# Patient Record
Sex: FEMALE | Race: WHITE | Employment: OTHER | URBAN - METROPOLITAN AREA
[De-identification: names, ages, dates, MRNs, and addresses within clinical notes are randomized per-mention and may not be internally consistent; named-entity substitution may affect disease eponyms.]

---

## 2018-09-17 ENCOUNTER — OFFICE VISIT (OUTPATIENT)
Dept: RHEUMATOLOGY | Facility: CLINIC | Age: 73
End: 2018-09-17
Payer: MEDICARE

## 2018-09-17 VITALS
HEIGHT: 63 IN | SYSTOLIC BLOOD PRESSURE: 120 MMHG | WEIGHT: 258 LBS | BODY MASS INDEX: 45.71 KG/M2 | DIASTOLIC BLOOD PRESSURE: 82 MMHG | RESPIRATION RATE: 16 BRPM | HEART RATE: 82 BPM

## 2018-09-17 DIAGNOSIS — E55.9 VITAMIN D DEFICIENCY: ICD-10-CM

## 2018-09-17 DIAGNOSIS — M17.0 PRIMARY OSTEOARTHRITIS OF BOTH KNEES: Primary | ICD-10-CM

## 2018-09-17 DIAGNOSIS — R01.1 HEART MURMUR: ICD-10-CM

## 2018-09-17 DIAGNOSIS — M79.642 BILATERAL HAND PAIN: ICD-10-CM

## 2018-09-17 DIAGNOSIS — Z87.39 HX OF ACUTE GOUTY ARTHRITIS: ICD-10-CM

## 2018-09-17 DIAGNOSIS — M79.641 BILATERAL HAND PAIN: ICD-10-CM

## 2018-09-17 DIAGNOSIS — Z87.448 HISTORY OF KIDNEY DISEASE: ICD-10-CM

## 2018-09-17 DIAGNOSIS — R53.82 CHRONIC FATIGUE: ICD-10-CM

## 2018-09-17 DIAGNOSIS — E66.01 MORBID OBESITY (HCC): ICD-10-CM

## 2018-09-17 PROCEDURE — 99204 OFFICE O/P NEW MOD 45 MIN: CPT | Performed by: INTERNAL MEDICINE

## 2018-09-17 RX ORDER — PREDNISONE 10 MG/1
TABLET ORAL AS DIRECTED
COMMUNITY
Start: 2018-01-08

## 2018-09-17 RX ORDER — FEXOFENADINE HCL 180 MG/1
180 TABLET ORAL DAILY
COMMUNITY
Start: 2017-08-04

## 2018-09-17 RX ORDER — TRAMADOL HYDROCHLORIDE 50 MG/1
50 TABLET ORAL EVERY 8 HOURS PRN
COMMUNITY
Start: 2018-01-08 | End: 2018-09-17

## 2018-09-17 RX ORDER — FLUTICASONE PROPIONATE 50 MCG
1 SPRAY, SUSPENSION (ML) NASAL DAILY
COMMUNITY
Start: 2018-08-23

## 2018-09-17 RX ORDER — DULOXETIN HYDROCHLORIDE 20 MG/1
20 CAPSULE, DELAYED RELEASE ORAL DAILY
Qty: 30 CAPSULE | Refills: 2 | Status: SHIPPED | OUTPATIENT
Start: 2018-09-17

## 2018-09-17 RX ORDER — RUFINAMIDE 40 MG/ML
1 SUSPENSION ORAL DAILY
COMMUNITY

## 2018-09-17 RX ORDER — TRAMADOL HYDROCHLORIDE 50 MG/1
50 TABLET ORAL EVERY 8 HOURS PRN
Qty: 90 TABLET | Refills: 0 | Status: SHIPPED | OUTPATIENT
Start: 2018-09-17 | End: 2018-09-17

## 2018-09-17 RX ORDER — LISINOPRIL 40 MG/1
40 TABLET ORAL DAILY
COMMUNITY
Start: 2018-08-20

## 2018-09-17 RX ORDER — CHOLECALCIFEROL (VITAMIN D3) 50 MCG
1 TABLET ORAL DAILY
COMMUNITY

## 2018-09-17 RX ORDER — OXYBUTYNIN CHLORIDE 10 MG/1
10 TABLET, EXTENDED RELEASE ORAL DAILY
COMMUNITY
Start: 2018-08-20

## 2018-09-17 NOTE — PROGRESS NOTES
?  RHEUMATOLOGY NEW PATIENT   Date of visit: 9/17/2018  ? Patient presents with:  Establish Care: New pt referred by son (Pt of Dr. Aroldo Long). Pt was seeing RH at [address] [place] [address] x 30 years. Pt c/o bilateral hands/knees/fingers/ankles pain (pain level 6/10).  Pt st hyaluronic acid derivative injections at next visit. Her hands are concerning for possible RA, so we will recheck her RF/CCP/CRP/ESR status as well as xrays of her hands.  She has not established care with a new PCP, but we will check her TSH as well as vit Future    Chronic fatigue  -     TSH W REFLEX TO FREE T4; Future  -     VITAMIN D, 25-HYDROXY; Future  -     CARD ECHO 2D DOPPLER (CPT=93710); Future    Heart murmur  -     CARD ECHO 2D DOPPLER (CPT=93815);  Future    Morbid obesity ([address]        Return in a dysphagia.        Past Medical History:  Past Medical History:   Diagnosis Date   • Allergic rhinitis    • Arthritis    • Esophageal reflux    • Essential hypertension    • Hyperthyroidism    • Obesity    • Osteoarthritis      Past Surgical History:  Past S Negative for chills, fever and malaise/fatigue. HENT: Negative for congestion, hearing loss and sinus pain. Eyes: Negative for blurred vision and double vision. Respiratory: Negative for cough and shortness of breath.     Cardiovascular: Negative for MCPs 2-4 bilaterally without synovitis. Also some enlargement of scattered DIPs without bogginess. No swelling, tenderness, redness or restriction of motion of the elbows, ankles, or joints of the feet.   Bilateral knees without medial joint line [facility]  Component Name Value Ref Range   GDT HISTORY: SCREENING    LOCATION OF SCAN:  LUMOC    LUMBAR SPINE:  THE AVG BMD OF THE L2-L4 REGION = 1.270  GM/CM2, T-SCORE = 0.6.   Z-SCORE = 1.0.  FINDINGS CONSISTENT WITH NORMAL BMD.  SIGNIFICANT Claribel Gonzalez KNEE:  THERE ARE ADVANCED DEGENERATIVE CHANGES WITH MULTIPLE OSTEOPHYTES AND   ALMOST COMPLETE LOSS OF THE MEDIAL FEMOROTIBIAL JOINT SPACE.  THE   PATELLOFEMORAL JOINT IS NARROWED.  THE TIBIA IS IN A VARUS POSITION.         Labs:  No results found for: WBC,

## 2018-10-02 ENCOUNTER — TELEPHONE (OUTPATIENT)
Dept: RHEUMATOLOGY | Facility: CLINIC | Age: 73
End: 2018-10-02

## 2018-10-02 NOTE — TELEPHONE ENCOUNTER
Returned pt's call. States she looked up duloxetine and is concerned about interaction with tramadol. Advised pt to decrease to one tablet of tramadol as needed in the mornings instead of 2-3 times daily.    Plan is to decrease/discontinue tramadol once

## 2018-12-21 DIAGNOSIS — M17.0 PRIMARY OSTEOARTHRITIS OF BOTH KNEES: ICD-10-CM

## 2018-12-21 RX ORDER — DULOXETIN HYDROCHLORIDE 20 MG/1
CAPSULE, DELAYED RELEASE ORAL
Qty: 30 CAPSULE | Refills: 1 | OUTPATIENT
Start: 2018-12-21

## 2019-11-11 ENCOUNTER — TELEPHONE (OUTPATIENT)
Dept: OCCUPATIONAL MEDICINE | Facility: HOSPITAL | Age: 74
End: 2019-11-11

## 2019-11-13 ENCOUNTER — HOSPITAL ENCOUNTER (OUTPATIENT)
Dept: OCCUPATIONAL MEDICINE | Facility: HOSPITAL | Age: 74
Setting detail: THERAPIES SERIES
Discharge: HOME | End: 2019-11-13
Attending: PLASTIC SURGERY
Payer: MEDICARE

## 2019-11-13 ENCOUNTER — ORDER TRANSCRIPTION (OUTPATIENT)
Dept: OCCUPATIONAL MEDICINE | Facility: HOSPITAL | Age: 74
End: 2019-11-13

## 2019-11-13 DIAGNOSIS — M25.432 PAIN AND SWELLING OF LEFT WRIST: Primary | ICD-10-CM

## 2019-11-13 DIAGNOSIS — M25.532 PAIN AND SWELLING OF LEFT WRIST: Primary | ICD-10-CM

## 2019-11-13 PROCEDURE — 97167 OT EVAL HIGH COMPLEX 60 MIN: CPT

## 2019-11-13 PROCEDURE — 97110 THERAPEUTIC EXERCISES: CPT

## 2019-11-13 NOTE — PROGRESS NOTES
OCCUPATIONAL THERAPY UPPER EXTREMITY EVALUATION   Referring Physician: Dr. Madeleine Campos  Diagnosis: pain and swelling of L wrist (R09.591,G48.720)     Date of Service: 11/13/2019     PATIENT SUMMARY   Marilee Doshi is a 68year old female who presents to  voiced understanding and performs HEP correctly without reported pain. Skilled Occupational Therapy is medically necessary to address the above impairments and reach functional goals.      Precautions:Heart Murmer  OBJECTIVE    OBSERVATION: Unremarkable increase L finger and Thumb IRVING by 10 degrees each jnt to ease  Safe gross and   Hook  tasks. 10 visits. 3- increase L   by (8#) and pinch by ( 4#)   To safely  Hold walker and steering whee. 10 visits.   4- consider daytime splinting to position a

## 2019-11-18 ENCOUNTER — HOSPITAL ENCOUNTER (OUTPATIENT)
Dept: OCCUPATIONAL MEDICINE | Facility: HOSPITAL | Age: 74
Setting detail: THERAPIES SERIES
Discharge: HOME | End: 2019-11-18
Attending: PLASTIC SURGERY
Payer: MEDICARE

## 2019-11-18 PROCEDURE — 97140 MANUAL THERAPY 1/> REGIONS: CPT

## 2019-11-18 PROCEDURE — 97110 THERAPEUTIC EXERCISES: CPT

## 2019-11-18 NOTE — PROGRESS NOTES
Dx: pain and swelling of L wrist (M25.532,M25.432)          Insurance (Authorized # of Visits):  2/12           Authorizing Physician: Dr. Brayden Valerio  Next MD visit: none scheduled  Fall Risk: standard         Precautions: n/a             Subjective:   Pt repor

## 2019-11-20 ENCOUNTER — HOSPITAL ENCOUNTER (OUTPATIENT)
Dept: OCCUPATIONAL MEDICINE | Facility: HOSPITAL | Age: 74
Setting detail: THERAPIES SERIES
Discharge: HOME | End: 2019-11-20
Attending: PLASTIC SURGERY
Payer: MEDICARE

## 2019-11-20 PROCEDURE — 97110 THERAPEUTIC EXERCISES: CPT

## 2019-11-20 PROCEDURE — 97140 MANUAL THERAPY 1/> REGIONS: CPT

## 2019-11-20 NOTE — PROGRESS NOTES
Dx: pain and swelling of L wrist (M25.532,M25.432)          Insurance (Authorized # of Visits):  2/12           Authorizing Physician: Dr. Shy Rojo MD visit: none scheduled  Fall Risk: standard         Precautions: n/a             Subjective:   Pt repor Reviewed HEP, added yel sponge ex. Gentle carpal distractions L. Gentle jnt mobs L fingers       Reviewed fit of custom thumb spica splint. Discussed neutral warmth for bilat hands wearing gloves while sleeping.              HEP:AROM ex L wrist and finge

## 2019-11-25 ENCOUNTER — HOSPITAL ENCOUNTER (OUTPATIENT)
Dept: OCCUPATIONAL MEDICINE | Facility: HOSPITAL | Age: 74
Setting detail: THERAPIES SERIES
Discharge: HOME | End: 2019-11-25
Attending: PLASTIC SURGERY
Payer: MEDICARE

## 2019-11-25 PROCEDURE — 97110 THERAPEUTIC EXERCISES: CPT

## 2019-11-25 PROCEDURE — 97140 MANUAL THERAPY 1/> REGIONS: CPT

## 2019-11-25 NOTE — PROGRESS NOTES
Dx: pain and swelling of L wrist (M25.532,M25.432)          Insurance (Authorized # of Visits):  4/12           Authorizing Physician: Dr. Madeleine Campos  Next MD visit: none scheduled  Fall Risk: standard         Precautions: n/a             Subjective:   Pt repor practicing rising from sit  Position Gentle jnt mobs L MCPs and carpals  Clear twist stik 2 directions bilat     Reviewed HEP, added yel sponge ex. Gentle carpal distractions L.   Gentle jnt mobs L fingers  Clothes pins pinch on/off red/yel and grn L     Re

## 2019-11-27 ENCOUNTER — TELEPHONE (OUTPATIENT)
Dept: PHYSICAL THERAPY | Facility: HOSPITAL | Age: 74
End: 2019-11-27

## 2019-12-02 ENCOUNTER — HOSPITAL ENCOUNTER (OUTPATIENT)
Dept: OCCUPATIONAL MEDICINE | Facility: HOSPITAL | Age: 74
Setting detail: THERAPIES SERIES
Discharge: HOME | End: 2019-12-02
Attending: PLASTIC SURGERY
Payer: MEDICARE

## 2019-12-02 PROCEDURE — 97140 MANUAL THERAPY 1/> REGIONS: CPT

## 2019-12-02 PROCEDURE — 97110 THERAPEUTIC EXERCISES: CPT

## 2019-12-02 NOTE — PROGRESS NOTES
Dx: pain and swelling of L wrist (M25.532,M25.432)          Insurance (Authorized # of Visits): 5/12           Authorizing Physician: Dr. Debo Rojo MD visit: 12/5/19  Fall Risk: standard         Precautions: n/a             Subjective:   Pt reports:  *pa carpals    Kleenex slide L and R on table top. Gentle pressure on L wrist ext practicing rising from sit  Position.  Clothespins   yel and red tip pinch on/off   Gentle pressure on L wrist ext practicing rising from sit  Position Gentle jnt mobs L MCPs and

## 2019-12-04 ENCOUNTER — TELEPHONE (OUTPATIENT)
Dept: OCCUPATIONAL MEDICINE | Facility: HOSPITAL | Age: 74
End: 2019-12-04

## 2019-12-06 ENCOUNTER — HOSPITAL ENCOUNTER (OUTPATIENT)
Dept: OCCUPATIONAL MEDICINE | Facility: HOSPITAL | Age: 74
Setting detail: THERAPIES SERIES
Discharge: HOME | End: 2019-12-06
Attending: INTERNAL MEDICINE
Payer: MEDICARE

## 2019-12-06 PROCEDURE — 97110 THERAPEUTIC EXERCISES: CPT

## 2019-12-06 PROCEDURE — 97140 MANUAL THERAPY 1/> REGIONS: CPT

## 2019-12-06 NOTE — PROGRESS NOTES
Dx: pain and swelling of L wrist (M25.532,M25.432)          Insurance (Authorized # of Visits): 6/12           Authorizing Physician: Dr. Madeleine Campos  Next MD visit: 12/5/19  Fall Risk: standard         Precautions: n/a             DISCHARGE Summary  Pt has atte 35#  (+13)   2 pt Pinch: 9# 10#  (+4)   3 pt Pinch: 12# 11#  (+5)   Lateral Pinch: 12# 10# (+2)      NECK SCREEN:   WNL all motions      MANUAL MUSCLE TESTING: (* denotes performed with pain)  All  and pinches are w/ pain  SPECIAL TESTS:   none    [facility] directions   yel sponge squeezes L hand gross and tip Kleenex slide L and R on table top. Kleenex slide L and R on table top. Gentle jnt mobs L MCPs and carpals Gentle jnt mobs L MCPs and carpals   Kleenex slide L and R on table top.   Gentle pressure on L

## 2023-01-31 ENCOUNTER — OFFICE VISIT (OUTPATIENT)
Dept: RHEUMATOLOGY | Facility: CLINIC | Age: 78
End: 2023-01-31
Payer: MEDICARE

## 2023-01-31 VITALS
SYSTOLIC BLOOD PRESSURE: 98 MMHG | WEIGHT: 239 LBS | RESPIRATION RATE: 18 BRPM | HEIGHT: 65 IN | TEMPERATURE: 98 F | HEART RATE: 105 BPM | OXYGEN SATURATION: 96 % | DIASTOLIC BLOOD PRESSURE: 82 MMHG | BODY MASS INDEX: 39.82 KG/M2

## 2023-01-31 DIAGNOSIS — M17.12 PRIMARY OSTEOARTHRITIS OF LEFT KNEE: Primary | ICD-10-CM

## 2023-01-31 DIAGNOSIS — E66.01 CLASS 2 SEVERE OBESITY DUE TO EXCESS CALORIES WITH SERIOUS COMORBIDITY AND BODY MASS INDEX (BMI) OF 39.0 TO 39.9 IN ADULT (HCC): ICD-10-CM

## 2023-01-31 DIAGNOSIS — M15.1 HEBERDEN'S NODES OF BOTH HANDS: ICD-10-CM

## 2023-01-31 DIAGNOSIS — M15.2 BOUCHARD NODES (DJD HAND): ICD-10-CM

## 2023-01-31 DIAGNOSIS — M17.11 PRIMARY OSTEOARTHRITIS OF RIGHT KNEE: ICD-10-CM

## 2023-01-31 PROBLEM — E66.812 CLASS 2 SEVERE OBESITY DUE TO EXCESS CALORIES WITH SERIOUS COMORBIDITY AND BODY MASS INDEX (BMI) OF 39.0 TO 39.9 IN ADULT (HCC): Status: ACTIVE | Noted: 2023-01-31

## 2023-01-31 PROBLEM — I48.91 ATRIAL FIBRILLATION WITH NORMAL VENTRICULAR RATE (HCC): Status: ACTIVE | Noted: 2023-01-31

## 2023-01-31 PROBLEM — R32 URINARY INCONTINENCE IN FEMALE: Status: ACTIVE | Noted: 2023-01-31

## 2023-01-31 PROBLEM — I10 PRIMARY HYPERTENSION: Status: ACTIVE | Noted: 2023-01-31

## 2023-01-31 PROBLEM — J30.1 HAY FEVER: Status: ACTIVE | Noted: 2023-01-31

## 2023-01-31 PROBLEM — M67.432 GANGLION CYST OF WRIST, LEFT: Status: ACTIVE | Noted: 2023-01-31

## 2023-01-31 PROCEDURE — 99204 OFFICE O/P NEW MOD 45 MIN: CPT | Performed by: INTERNAL MEDICINE

## 2023-01-31 PROCEDURE — 20610 DRAIN/INJ JOINT/BURSA W/O US: CPT | Performed by: INTERNAL MEDICINE

## 2023-01-31 RX ORDER — METOPROLOL SUCCINATE 50 MG/1
50 TABLET, EXTENDED RELEASE ORAL DAILY
COMMUNITY
Start: 2022-12-31

## 2023-01-31 RX ORDER — RIVAROXABAN 15 MG/1
TABLET, FILM COATED ORAL
COMMUNITY
Start: 2023-01-26

## 2023-01-31 RX ORDER — TRAMADOL HYDROCHLORIDE 50 MG/1
50 TABLET ORAL EVERY 6 HOURS PRN
Qty: 120 TABLET | Refills: 1 | Status: SHIPPED | OUTPATIENT
Start: 2023-01-31

## 2023-01-31 RX ORDER — METHYLPREDNISOLONE ACETATE 40 MG/ML
40 INJECTION, SUSPENSION INTRA-ARTICULAR; INTRALESIONAL; INTRAMUSCULAR; SOFT TISSUE ONCE
Status: COMPLETED | OUTPATIENT
Start: 2023-01-31 | End: 2023-01-31

## 2023-01-31 RX ORDER — PREDNISONE 10 MG/1
10 TABLET ORAL 2 TIMES DAILY PRN
Qty: 30 TABLET | Refills: 1 | Status: SHIPPED | OUTPATIENT
Start: 2023-01-31

## 2023-01-31 RX ADMIN — METHYLPREDNISOLONE ACETATE 40 MG: 40 INJECTION, SUSPENSION INTRA-ARTICULAR; INTRALESIONAL; INTRAMUSCULAR; SOFT TISSUE at 11:53:00

## 2023-01-31 RX ADMIN — METHYLPREDNISOLONE ACETATE 40 MG: 40 INJECTION, SUSPENSION INTRA-ARTICULAR; INTRALESIONAL; INTRAMUSCULAR; SOFT TISSUE at 12:00:00

## 2023-01-31 NOTE — PATIENT INSTRUCTIONS
You have osteoarthritis in both knees and both hands. Your left wrist has a degenerative cyst.  The spurs in your fingers are due to degenerative changes. YOu have Heberden and Nicky nodes = degenerative spurs of the fingers. Use ES Tylenol 500 mg 1-2 twice a day, as needed for pain. No ibuprofen, no aleve with Xarelto. No NSAIDs with Xarelto. Tramadol 50 mg as needed up to 4 per day for severe pain unrelieved by Tylenol. You can also use voltaren gel or aspircream as needed 3-4 times a day on sore joints. Today both knees were injected with cortisone. Gel injections are another option for the knees in the further. Knee replacement is the final option. Return to office 5 months.

## 2023-03-01 ENCOUNTER — HOSPITAL ENCOUNTER (OUTPATIENT)
Dept: LAB | Facility: HOSPITAL | Age: 78
Discharge: HOME | End: 2023-03-01
Attending: INTERNAL MEDICINE
Payer: MEDICARE

## 2023-03-01 LAB
ALBUMIN SERPL-MCNC: 3.7 G/DL (ref 3.4–5)
ALBUMIN/GLOB SERPL: 1.1 {RATIO} (ref 1–2)
ALP LIVER SERPL-CCNC: 80 U/L
ALT SERPL-CCNC: 23 U/L
ANION GAP SERPL CALC-SCNC: 6 MMOL/L (ref 0–18)
AST SERPL-CCNC: 13 U/L (ref 15–37)
BASOPHILS # BLD AUTO: 0.04 X10(3) UL (ref 0–0.2)
BASOPHILS NFR BLD AUTO: 0.9 %
BILIRUB SERPL-MCNC: 0.7 MG/DL (ref 0.1–2)
BUN BLD-MCNC: 14 MG/DL (ref 7–18)
CALCIUM BLD-MCNC: 9.1 MG/DL (ref 8.5–10.1)
CHLORIDE SERPL-SCNC: 108 MMOL/L (ref 98–112)
CHOLEST SERPL-MCNC: 143 MG/DL (ref ?–200)
CO2 SERPL-SCNC: 28 MMOL/L (ref 21–32)
CREAT BLD-MCNC: 1.14 MG/DL
CRP SERPL HS-MCNC: 2.37 MG/L (ref ?–3)
EOSINOPHIL # BLD AUTO: 0.14 X10(3) UL (ref 0–0.7)
EOSINOPHIL NFR BLD AUTO: 3.1 %
ERYTHROCYTE [DISTWIDTH] IN BLOOD BY AUTOMATED COUNT: 14.3 %
FASTING PATIENT LIPID ANSWER: YES
FASTING STATUS PATIENT QL REPORTED: YES
GFR SERPLBLD BASED ON 1.73 SQ M-ARVRAT: 50 ML/MIN/1.73M2 (ref 60–?)
GLOBULIN PLAS-MCNC: 3.5 G/DL (ref 2.8–4.4)
GLUCOSE BLD-MCNC: 95 MG/DL (ref 70–99)
HCT VFR BLD AUTO: 42.8 %
HDLC SERPL-MCNC: 57 MG/DL (ref 40–59)
HGB BLD-MCNC: 13.3 G/DL
IMM GRANULOCYTES # BLD AUTO: 0 X10(3) UL (ref 0–1)
IMM GRANULOCYTES NFR BLD: 0 %
LDLC SERPL CALC-MCNC: 68 MG/DL (ref ?–100)
LYMPHOCYTES # BLD AUTO: 0.97 X10(3) UL (ref 1–4)
LYMPHOCYTES NFR BLD AUTO: 21.6 %
MCH RBC QN AUTO: 26.4 PG (ref 26–34)
MCHC RBC AUTO-ENTMCNC: 31.1 G/DL (ref 31–37)
MCV RBC AUTO: 85.1 FL
MONOCYTES # BLD AUTO: 0.31 X10(3) UL (ref 0.1–1)
MONOCYTES NFR BLD AUTO: 6.9 %
NEUTROPHILS # BLD AUTO: 3.04 X10 (3) UL (ref 1.5–7.7)
NEUTROPHILS # BLD AUTO: 3.04 X10(3) UL (ref 1.5–7.7)
NEUTROPHILS NFR BLD AUTO: 67.5 %
NONHDLC SERPL-MCNC: 86 MG/DL (ref ?–130)
NT-PROBNP SERPL-MCNC: 1409 PG/ML (ref ?–450)
OSMOLALITY SERPL CALC.SUM OF ELEC: 294 MOSM/KG (ref 275–295)
PLATELET # BLD AUTO: 259 10(3)UL (ref 150–450)
POTASSIUM SERPL-SCNC: 3.3 MMOL/L (ref 3.5–5.1)
PROT SERPL-MCNC: 7.2 G/DL (ref 6.4–8.2)
RBC # BLD AUTO: 5.03 X10(6)UL
SODIUM SERPL-SCNC: 142 MMOL/L (ref 136–145)
T4 FREE SERPL-MCNC: 1.5 NG/DL (ref 0.8–1.7)
TRIGL SERPL-MCNC: 94 MG/DL (ref 30–149)
TSI SER-ACNC: 0.01 MIU/ML (ref 0.36–3.74)
VLDLC SERPL CALC-MCNC: 14 MG/DL (ref 0–30)
WBC # BLD AUTO: 4.5 X10(3) UL (ref 4–11)

## 2023-03-01 PROCEDURE — 85025 COMPLETE CBC W/AUTO DIFF WBC: CPT | Performed by: INTERNAL MEDICINE

## 2023-03-01 PROCEDURE — 84443 ASSAY THYROID STIM HORMONE: CPT | Performed by: INTERNAL MEDICINE

## 2023-03-01 PROCEDURE — 80053 COMPREHEN METABOLIC PANEL: CPT | Performed by: INTERNAL MEDICINE

## 2023-03-01 PROCEDURE — 36415 COLL VENOUS BLD VENIPUNCTURE: CPT | Performed by: INTERNAL MEDICINE

## 2023-03-01 PROCEDURE — 83880 ASSAY OF NATRIURETIC PEPTIDE: CPT | Performed by: INTERNAL MEDICINE

## 2023-03-01 PROCEDURE — 86141 C-REACTIVE PROTEIN HS: CPT | Performed by: INTERNAL MEDICINE

## 2023-03-01 PROCEDURE — 84439 ASSAY OF FREE THYROXINE: CPT | Performed by: INTERNAL MEDICINE

## 2023-03-01 PROCEDURE — 80061 LIPID PANEL: CPT | Performed by: INTERNAL MEDICINE

## 2023-03-03 ENCOUNTER — HOSPITAL ENCOUNTER (OUTPATIENT)
Dept: ULTRASOUND IMAGING | Facility: HOSPITAL | Age: 78
Discharge: HOME | End: 2023-03-03
Attending: INTERNAL MEDICINE
Payer: MEDICARE

## 2023-03-03 DIAGNOSIS — E05.90 HYPERTHYROIDISM: ICD-10-CM

## 2023-03-03 DIAGNOSIS — E04.9 GOITER: ICD-10-CM

## 2023-03-03 PROCEDURE — 76536 US EXAM OF HEAD AND NECK: CPT | Performed by: INTERNAL MEDICINE

## 2023-05-02 ENCOUNTER — TELEPHONE (OUTPATIENT)
Dept: RHEUMATOLOGY | Facility: CLINIC | Age: 78
End: 2023-05-02

## 2023-05-02 NOTE — TELEPHONE ENCOUNTER
Pt phoned office, pt has been taking Tramadol for many years and feels medication is no longer helping with her pain. Advised we will place on cancellation list to be seen sooner to discuss with Dr. Jose Cbob.      Future Appointments   Date Time Provider Mei Friasi   3/76/5438 41:02 AM MD SABA ZamoraRHEUMHHERMINIO UREÑA 1/31/23  RTO in [place]

## 2023-05-10 ENCOUNTER — OFFICE VISIT (OUTPATIENT)
Dept: RHEUMATOLOGY | Facility: CLINIC | Age: 78
End: 2023-05-10
Payer: MEDICARE

## 2023-05-10 VITALS
DIASTOLIC BLOOD PRESSURE: 64 MMHG | HEART RATE: 90 BPM | HEIGHT: 64 IN | TEMPERATURE: 98 F | OXYGEN SATURATION: 94 % | RESPIRATION RATE: 18 BRPM | BODY MASS INDEX: 38.13 KG/M2 | SYSTOLIC BLOOD PRESSURE: 120 MMHG | WEIGHT: 223.38 LBS

## 2023-05-10 DIAGNOSIS — M10.041 ACUTE IDIOPATHIC GOUT OF RIGHT HAND: ICD-10-CM

## 2023-05-10 DIAGNOSIS — M15.1 HEBERDEN'S NODES OF BOTH HANDS: Primary | ICD-10-CM

## 2023-05-10 DIAGNOSIS — M15.2 BOUCHARD NODES (DJD HAND): ICD-10-CM

## 2023-05-10 DIAGNOSIS — M17.12 PRIMARY OSTEOARTHRITIS OF LEFT KNEE: ICD-10-CM

## 2023-05-10 DIAGNOSIS — M17.11 PRIMARY OSTEOARTHRITIS OF RIGHT KNEE: ICD-10-CM

## 2023-05-10 PROCEDURE — 99214 OFFICE O/P EST MOD 30 MIN: CPT | Performed by: INTERNAL MEDICINE

## 2023-05-10 RX ORDER — ALLOPURINOL 100 MG/1
100 TABLET ORAL DAILY
Qty: 30 TABLET | Refills: 3 | Status: SHIPPED | OUTPATIENT
Start: 2023-05-10

## 2023-05-10 RX ORDER — ACETAMINOPHEN 500 MG
1000 TABLET ORAL
COMMUNITY
Start: 2021-01-27

## 2023-05-10 RX ORDER — DIGOXIN 125 MCG
TABLET ORAL
COMMUNITY
Start: 2023-03-13

## 2023-05-10 RX ORDER — COLCHICINE 0.6 MG/1
TABLET ORAL
COMMUNITY
Start: 2022-05-09

## 2023-05-10 RX ORDER — PREDNISONE 5 MG/1
5 TABLET ORAL 2 TIMES DAILY
Qty: 60 TABLET | Refills: 1 | Status: SHIPPED | OUTPATIENT
Start: 2023-05-10

## 2023-05-10 RX ORDER — COLCHICINE 0.6 MG/1
0.6 TABLET ORAL 2 TIMES DAILY
Qty: 60 TABLET | Refills: 2 | Status: SHIPPED | OUTPATIENT
Start: 2023-05-10

## 2023-05-10 RX ORDER — TRAMADOL HYDROCHLORIDE 50 MG/1
50 TABLET ORAL 4 TIMES DAILY PRN
Qty: 120 TABLET | Refills: 1 | Status: SHIPPED | OUTPATIENT
Start: 2023-05-10

## 2023-05-10 NOTE — PATIENT INSTRUCTIONS
Drink extra fluid. You have gout in the right little finger. Gout diet - avoid red meat, organ meats, shellfish, alcohol and fructose containing drinks-soda pop. Colchicine 0.6 mg twice a day - once the right little finger improves lower to once a day. Add allopurinol for gout 100 mg per day. Prednisone 5 mg twice a day until the pain is better. No ibuproofen due to Xarelto. Check uric acid level. At [facility] it was 7.9, normal level should be below 6.0 otherwise your at risk for gout attacks. Tramadol 50 mg 4 times a day for pain if needed. Return to office 1 month.

## 2023-05-24 ENCOUNTER — HOSPITAL ENCOUNTER (OUTPATIENT)
Dept: LAB | Facility: HOSPITAL | Age: 78
Discharge: HOME | End: 2023-05-24
Attending: INTERNAL MEDICINE
Payer: MEDICARE

## 2023-05-24 DIAGNOSIS — M10.041 ACUTE IDIOPATHIC GOUT OF RIGHT HAND: ICD-10-CM

## 2023-05-24 LAB
ALBUMIN SERPL-MCNC: 3.9 G/DL (ref 3.4–5)
ALBUMIN/GLOB SERPL: 1.1 {RATIO} (ref 1–2)
ALP LIVER SERPL-CCNC: 73 U/L
ALT SERPL-CCNC: 21 U/L
ANION GAP SERPL CALC-SCNC: 5 MMOL/L (ref 0–18)
AST SERPL-CCNC: 16 U/L (ref 15–37)
BASOPHILS # BLD AUTO: 0.03 X10(3) UL (ref 0–0.2)
BASOPHILS NFR BLD AUTO: 0.6 %
BILIRUB SERPL-MCNC: 1 MG/DL (ref 0.1–2)
BUN BLD-MCNC: 12 MG/DL (ref 7–18)
CALCIUM BLD-MCNC: 9.5 MG/DL (ref 8.5–10.1)
CHLORIDE SERPL-SCNC: 105 MMOL/L (ref 98–112)
CHOLEST SERPL-MCNC: 148 MG/DL (ref ?–200)
CO2 SERPL-SCNC: 31 MMOL/L (ref 21–32)
CREAT BLD-MCNC: 1.15 MG/DL
CRP SERPL HS-MCNC: 3.43 MG/L (ref ?–3)
EOSINOPHIL # BLD AUTO: 0.18 X10(3) UL (ref 0–0.7)
EOSINOPHIL NFR BLD AUTO: 3.5 %
ERYTHROCYTE [DISTWIDTH] IN BLOOD BY AUTOMATED COUNT: 13.9 %
FASTING PATIENT LIPID ANSWER: YES
FASTING STATUS PATIENT QL REPORTED: YES
GFR SERPLBLD BASED ON 1.73 SQ M-ARVRAT: 49 ML/MIN/1.73M2 (ref 60–?)
GLOBULIN PLAS-MCNC: 3.5 G/DL (ref 2.8–4.4)
GLUCOSE BLD-MCNC: 99 MG/DL (ref 70–99)
HCT VFR BLD AUTO: 43.9 %
HDLC SERPL-MCNC: 55 MG/DL (ref 40–59)
HGB BLD-MCNC: 13.6 G/DL
IMM GRANULOCYTES # BLD AUTO: 0.01 X10(3) UL (ref 0–1)
IMM GRANULOCYTES NFR BLD: 0.2 %
LDLC SERPL CALC-MCNC: 73 MG/DL (ref ?–100)
LYMPHOCYTES # BLD AUTO: 1.13 X10(3) UL (ref 1–4)
LYMPHOCYTES NFR BLD AUTO: 22.2 %
MCH RBC QN AUTO: 25.5 PG (ref 26–34)
MCHC RBC AUTO-ENTMCNC: 31 G/DL (ref 31–37)
MCV RBC AUTO: 82.2 FL
MONOCYTES # BLD AUTO: 0.44 X10(3) UL (ref 0.1–1)
MONOCYTES NFR BLD AUTO: 8.6 %
NEUTROPHILS # BLD AUTO: 3.31 X10 (3) UL (ref 1.5–7.7)
NEUTROPHILS # BLD AUTO: 3.31 X10(3) UL (ref 1.5–7.7)
NEUTROPHILS NFR BLD AUTO: 64.9 %
NONHDLC SERPL-MCNC: 93 MG/DL (ref ?–130)
NT-PROBNP SERPL-MCNC: 1581 PG/ML (ref ?–450)
OSMOLALITY SERPL CALC.SUM OF ELEC: 292 MOSM/KG (ref 275–295)
PLATELET # BLD AUTO: 283 10(3)UL (ref 150–450)
POTASSIUM SERPL-SCNC: 4.3 MMOL/L (ref 3.5–5.1)
PROT SERPL-MCNC: 7.4 G/DL (ref 6.4–8.2)
RBC # BLD AUTO: 5.34 X10(6)UL
SODIUM SERPL-SCNC: 141 MMOL/L (ref 136–145)
T4 FREE SERPL-MCNC: 1.4 NG/DL (ref 0.8–1.7)
TRIGL SERPL-MCNC: 113 MG/DL (ref 30–149)
TSI SER-ACNC: 0.05 MIU/ML (ref 0.36–3.74)
URATE SERPL-MCNC: 7.3 MG/DL
VLDLC SERPL CALC-MCNC: 17 MG/DL (ref 0–30)
WBC # BLD AUTO: 5.1 X10(3) UL (ref 4–11)

## 2023-05-24 PROCEDURE — 86141 C-REACTIVE PROTEIN HS: CPT | Performed by: INTERNAL MEDICINE

## 2023-05-24 PROCEDURE — 80053 COMPREHEN METABOLIC PANEL: CPT | Performed by: INTERNAL MEDICINE

## 2023-05-24 PROCEDURE — 36415 COLL VENOUS BLD VENIPUNCTURE: CPT | Performed by: INTERNAL MEDICINE

## 2023-05-24 PROCEDURE — 84550 ASSAY OF BLOOD/URIC ACID: CPT | Performed by: INTERNAL MEDICINE

## 2023-05-24 PROCEDURE — 85025 COMPLETE CBC W/AUTO DIFF WBC: CPT | Performed by: INTERNAL MEDICINE

## 2023-05-24 PROCEDURE — 84443 ASSAY THYROID STIM HORMONE: CPT | Performed by: INTERNAL MEDICINE

## 2023-05-24 PROCEDURE — 80061 LIPID PANEL: CPT | Performed by: INTERNAL MEDICINE

## 2023-05-24 PROCEDURE — 84439 ASSAY OF FREE THYROXINE: CPT | Performed by: INTERNAL MEDICINE

## 2023-05-24 PROCEDURE — 83880 ASSAY OF NATRIURETIC PEPTIDE: CPT | Performed by: INTERNAL MEDICINE

## 2023-06-29 ENCOUNTER — OFFICE VISIT (OUTPATIENT)
Dept: RHEUMATOLOGY | Facility: CLINIC | Age: 78
End: 2023-06-29
Payer: MEDICARE

## 2023-06-29 VITALS
RESPIRATION RATE: 16 BRPM | OXYGEN SATURATION: 98 % | SYSTOLIC BLOOD PRESSURE: 112 MMHG | BODY MASS INDEX: 36.37 KG/M2 | WEIGHT: 213 LBS | TEMPERATURE: 97 F | DIASTOLIC BLOOD PRESSURE: 64 MMHG | HEART RATE: 96 BPM | HEIGHT: 64 IN

## 2023-06-29 DIAGNOSIS — M15.2 BOUCHARD NODES (DJD HAND): ICD-10-CM

## 2023-06-29 DIAGNOSIS — M17.12 PRIMARY OSTEOARTHRITIS OF LEFT KNEE: ICD-10-CM

## 2023-06-29 DIAGNOSIS — M15.1 HEBERDEN'S NODES OF BOTH HANDS: ICD-10-CM

## 2023-06-29 DIAGNOSIS — M17.11 PRIMARY OSTEOARTHRITIS OF RIGHT KNEE: Primary | ICD-10-CM

## 2023-06-29 PROCEDURE — 20610 DRAIN/INJ JOINT/BURSA W/O US: CPT | Performed by: INTERNAL MEDICINE

## 2023-06-29 RX ORDER — METHYLPREDNISOLONE ACETATE 40 MG/ML
40 INJECTION, SUSPENSION INTRA-ARTICULAR; INTRALESIONAL; INTRAMUSCULAR; SOFT TISSUE ONCE
Status: COMPLETED | OUTPATIENT
Start: 2023-06-29 | End: 2023-06-29

## 2023-06-29 RX ORDER — PREDNISONE 5 MG/1
5 TABLET ORAL 2 TIMES DAILY
Qty: 60 TABLET | Refills: 2 | Status: SHIPPED | OUTPATIENT
Start: 2023-06-29

## 2023-06-29 RX ORDER — TRAMADOL HYDROCHLORIDE 50 MG/1
50 TABLET ORAL EVERY 6 HOURS PRN
Qty: 120 TABLET | Refills: 2 | Status: SHIPPED | OUTPATIENT
Start: 2023-06-29

## 2023-06-29 RX ORDER — COLCHICINE 0.6 MG/1
0.6 TABLET ORAL 2 TIMES DAILY
Qty: 60 TABLET | Refills: 2 | Status: SHIPPED | OUTPATIENT
Start: 2023-06-29

## 2023-06-29 RX ADMIN — METHYLPREDNISOLONE ACETATE 40 MG: 40 INJECTION, SUSPENSION INTRA-ARTICULAR; INTRALESIONAL; INTRAMUSCULAR; SOFT TISSUE at 13:25:00

## 2023-09-28 ENCOUNTER — OFFICE VISIT (OUTPATIENT)
Dept: RHEUMATOLOGY | Facility: CLINIC | Age: 78
End: 2023-09-28
Payer: MEDICARE

## 2023-09-28 VITALS
BODY MASS INDEX: 32.41 KG/M2 | SYSTOLIC BLOOD PRESSURE: 118 MMHG | HEIGHT: 64 IN | HEART RATE: 95 BPM | TEMPERATURE: 98 F | OXYGEN SATURATION: 99 % | DIASTOLIC BLOOD PRESSURE: 72 MMHG | WEIGHT: 189.81 LBS | RESPIRATION RATE: 16 BRPM

## 2023-09-28 DIAGNOSIS — M10.041 ACUTE IDIOPATHIC GOUT OF RIGHT HAND: ICD-10-CM

## 2023-09-28 DIAGNOSIS — M17.11 PRIMARY OSTEOARTHRITIS OF RIGHT KNEE: ICD-10-CM

## 2023-09-28 DIAGNOSIS — M15.2 BOUCHARD NODES (DJD HAND): ICD-10-CM

## 2023-09-28 DIAGNOSIS — M17.12 PRIMARY OSTEOARTHRITIS OF LEFT KNEE: Primary | ICD-10-CM

## 2023-09-28 DIAGNOSIS — M15.1 HEBERDEN'S NODES OF BOTH HANDS: ICD-10-CM

## 2023-09-28 PROCEDURE — 99213 OFFICE O/P EST LOW 20 MIN: CPT | Performed by: INTERNAL MEDICINE

## 2023-09-28 PROCEDURE — 20610 DRAIN/INJ JOINT/BURSA W/O US: CPT | Performed by: INTERNAL MEDICINE

## 2023-09-28 RX ORDER — ALLOPURINOL 100 MG/1
200 TABLET ORAL DAILY
Qty: 180 TABLET | Refills: 3 | Status: SHIPPED | OUTPATIENT
Start: 2023-09-28 | End: 2024-09-22

## 2023-09-28 RX ORDER — COLCHICINE 0.6 MG/1
0.6 TABLET ORAL 2 TIMES DAILY
Qty: 180 TABLET | Refills: 2 | Status: SHIPPED | OUTPATIENT
Start: 2023-09-28

## 2023-09-28 RX ORDER — METHYLPREDNISOLONE ACETATE 40 MG/ML
40 INJECTION, SUSPENSION INTRA-ARTICULAR; INTRALESIONAL; INTRAMUSCULAR; SOFT TISSUE ONCE
Status: COMPLETED | OUTPATIENT
Start: 2023-09-28 | End: 2023-09-28

## 2023-09-28 RX ORDER — HYDROCODONE BITARTRATE AND ACETAMINOPHEN 5; 325 MG/1; MG/1
1 TABLET ORAL EVERY 8 HOURS PRN
Qty: 30 TABLET | Refills: 0 | Status: SHIPPED | OUTPATIENT
Start: 2023-09-28

## 2023-09-28 RX ORDER — TRAMADOL HYDROCHLORIDE 50 MG/1
50 TABLET ORAL EVERY 6 HOURS PRN
Qty: 120 TABLET | Refills: 2 | Status: SHIPPED | OUTPATIENT
Start: 2023-09-28

## 2023-09-28 RX ADMIN — METHYLPREDNISOLONE ACETATE 40 MG: 40 INJECTION, SUSPENSION INTRA-ARTICULAR; INTRALESIONAL; INTRAMUSCULAR; SOFT TISSUE at 12:09:00

## 2023-09-28 RX ADMIN — METHYLPREDNISOLONE ACETATE 40 MG: 40 INJECTION, SUSPENSION INTRA-ARTICULAR; INTRALESIONAL; INTRAMUSCULAR; SOFT TISSUE at 12:57:00

## 2023-09-28 NOTE — PATIENT INSTRUCTIONS
Today both knees were injected. Both the right and left knee injected with cortisone. Go home today and rest.  Apply ice pack later if needed. For mild pain take X strength Tylenol 500 mg 1 or 2 daily. For more severe pain use tramadol 50 mg no more often than 1 every 6 hours. Finally for pain which is uncontrolled by Tylenol or tramadol use of Norco.  Regarding your gout increase your allopurinol to 200 mg/day. Continue to follow a gout diet which means avoid red meat, shellfish, organ meat, and alcohol. Continue colchicine 0.6 mg twice a day. Return to office for recheck 3 months.

## 2023-09-28 NOTE — PROCEDURES
Procedure note. Bilateral knee injections. [id number]. Diagnosis code bilateral osteoarthritis of the knees primary. Timeout was initially done injection site identified, then both knees were cleansed with alcohol wipes and Hibiclens. Consent was obtained from patient. Right knee injected with 40 mg of methylprednisolone and 1 cc of lidocaine. Then the left knee was injected medially with 40 mg of methylprednisolone and 1 cc of 1% lidocaine. Patient tolerated the bilateral knee injections without incident.

## 2023-10-16 ENCOUNTER — TELEPHONE (OUTPATIENT)
Dept: RHEUMATOLOGY | Facility: CLINIC | Age: 78
End: 2023-10-16

## 2023-12-28 RX ORDER — GABAPENTIN 300 MG/1
300 CAPSULE ORAL 2 TIMES DAILY PRN
Qty: 60 CAPSULE | Refills: 1 | Status: SHIPPED | OUTPATIENT
Start: 2023-12-28

## 2023-12-29 ENCOUNTER — TELEPHONE (OUTPATIENT)
Dept: INTERNAL MEDICINE CLINIC | Facility: CLINIC | Age: 78
End: 2023-12-29

## 2024-01-11 ENCOUNTER — TELEPHONE (OUTPATIENT)
Facility: LOCATION | Age: 79
End: 2024-01-11

## 2024-01-11 NOTE — TELEPHONE ENCOUNTER
Patient has an appointment for  today @ 340pm . .. Would like to talk to someone on the clinical team

## 2024-01-11 NOTE — TELEPHONE ENCOUNTER
Called pt back and she had set up an appointment for next week since her car was towed. Cancelled this appointment

## 2024-01-18 ENCOUNTER — OFFICE VISIT (OUTPATIENT)
Dept: RHEUMATOLOGY | Facility: CLINIC | Age: 79
End: 2024-01-18
Payer: MEDICARE

## 2024-01-18 ENCOUNTER — OFFICE VISIT (OUTPATIENT)
Dept: FAMILY MEDICINE CLINIC | Facility: CLINIC | Age: 79
End: 2024-01-18
Payer: MEDICARE

## 2024-01-18 VITALS
RESPIRATION RATE: 16 BRPM | BODY MASS INDEX: 33.13 KG/M2 | TEMPERATURE: 98 F | HEART RATE: 78 BPM | HEIGHT: 63 IN | SYSTOLIC BLOOD PRESSURE: 124 MMHG | WEIGHT: 187 LBS | DIASTOLIC BLOOD PRESSURE: 60 MMHG | OXYGEN SATURATION: 96 %

## 2024-01-18 VITALS
WEIGHT: 187 LBS | DIASTOLIC BLOOD PRESSURE: 70 MMHG | HEART RATE: 72 BPM | RESPIRATION RATE: 16 BRPM | BODY MASS INDEX: 33.13 KG/M2 | HEIGHT: 63 IN | OXYGEN SATURATION: 98 % | SYSTOLIC BLOOD PRESSURE: 122 MMHG

## 2024-01-18 DIAGNOSIS — E66.01 CLASS 2 SEVERE OBESITY DUE TO EXCESS CALORIES WITH SERIOUS COMORBIDITY AND BODY MASS INDEX (BMI) OF 39.0 TO 39.9 IN ADULT  (HCC): ICD-10-CM

## 2024-01-18 DIAGNOSIS — M17.12 PRIMARY OSTEOARTHRITIS OF LEFT KNEE: Primary | ICD-10-CM

## 2024-01-18 DIAGNOSIS — R09.81 NASAL CONGESTION: ICD-10-CM

## 2024-01-18 DIAGNOSIS — M10.041 ACUTE IDIOPATHIC GOUT OF RIGHT HAND: ICD-10-CM

## 2024-01-18 DIAGNOSIS — R32 URINARY INCONTINENCE IN FEMALE: ICD-10-CM

## 2024-01-18 DIAGNOSIS — Z78.0 POSTMENOPAUSAL: ICD-10-CM

## 2024-01-18 DIAGNOSIS — M17.11 PRIMARY OSTEOARTHRITIS OF RIGHT KNEE: ICD-10-CM

## 2024-01-18 DIAGNOSIS — M15.1 HEBERDEN'S NODES OF BOTH HANDS: ICD-10-CM

## 2024-01-18 DIAGNOSIS — G62.9 NEUROPATHY: ICD-10-CM

## 2024-01-18 DIAGNOSIS — Z76.89 ENCOUNTER TO ESTABLISH CARE: ICD-10-CM

## 2024-01-18 DIAGNOSIS — M15.2 BOUCHARD NODES (DJD HAND): ICD-10-CM

## 2024-01-18 DIAGNOSIS — I48.91 ATRIAL FIBRILLATION WITH NORMAL VENTRICULAR RATE (HCC): Primary | ICD-10-CM

## 2024-01-18 PROCEDURE — 99214 OFFICE O/P EST MOD 30 MIN: CPT | Performed by: STUDENT IN AN ORGANIZED HEALTH CARE EDUCATION/TRAINING PROGRAM

## 2024-01-18 RX ORDER — METHYLPREDNISOLONE ACETATE 40 MG/ML
40 INJECTION, SUSPENSION INTRA-ARTICULAR; INTRALESIONAL; INTRAMUSCULAR; SOFT TISSUE ONCE
Status: COMPLETED | OUTPATIENT
Start: 2024-01-18 | End: 2024-01-18

## 2024-01-18 RX ORDER — TRAMADOL HYDROCHLORIDE 50 MG/1
50 TABLET ORAL EVERY 6 HOURS PRN
Qty: 120 TABLET | Refills: 2 | Status: SHIPPED | OUTPATIENT
Start: 2024-01-18

## 2024-01-18 RX ORDER — GABAPENTIN 300 MG/1
300 CAPSULE ORAL 2 TIMES DAILY PRN
Qty: 180 CAPSULE | Refills: 1 | Status: SHIPPED | OUTPATIENT
Start: 2024-01-18

## 2024-01-18 RX ORDER — FLUTICASONE PROPIONATE 50 MCG
1 SPRAY, SUSPENSION (ML) NASAL DAILY
Qty: 16 G | Refills: 0 | Status: SHIPPED | OUTPATIENT
Start: 2024-01-18

## 2024-01-18 RX ORDER — OXYBUTYNIN CHLORIDE 10 MG/1
10 TABLET, EXTENDED RELEASE ORAL DAILY
Qty: 90 TABLET | Refills: 0 | Status: SHIPPED | OUTPATIENT
Start: 2024-01-18

## 2024-01-18 RX ORDER — HYDROCODONE BITARTRATE AND ACETAMINOPHEN 5; 325 MG/1; MG/1
1 TABLET ORAL EVERY 8 HOURS PRN
Qty: 30 TABLET | Refills: 0 | Status: SHIPPED | OUTPATIENT
Start: 2024-01-18

## 2024-01-18 RX ORDER — COLCHICINE 0.6 MG/1
0.6 TABLET ORAL 2 TIMES DAILY
Qty: 180 TABLET | Refills: 2 | Status: SHIPPED | OUTPATIENT
Start: 2024-01-18

## 2024-01-18 RX ADMIN — METHYLPREDNISOLONE ACETATE 40 MG: 40 INJECTION, SUSPENSION INTRA-ARTICULAR; INTRALESIONAL; INTRAMUSCULAR; SOFT TISSUE at 10:20:00

## 2024-01-18 NOTE — PATIENT INSTRUCTIONS
Colchicine  0,6 mg up to twice a day for gout attack.   Gabapentin 300 mg twice a day for nerve pain.  Tramadol 50 mg every 6 hours as needed for pain.  Norco for severe pain 5 mg once and a while.  Today both knees injected with cortisone.  Go home rest x 24 hours.  Ice prn for 10 minutes.   My office will apply for Hyalgan gel shotw for your knees.  RTO 3 months.

## 2024-01-18 NOTE — PROGRESS NOTES
[facility] Office Note    HPI:     Radha Brand is a 78 year old female presenting to hospitals care.     Afib  Diagnosed back in 23.   Sees cardiology at [facility].   On digoxin and xarelto and metoprolol.   Endorses symptoms are stable.     Neuropathy of feet  On gabapentin 300 mg BID  Described as sharp, shooting pain, worse at night at times.   Started in right ankle, then radiated through top of foot to toes.   Currently right foot is better but now left foot pain is proceeding.   These neuropathy issues have been going on for a year.   She feels the symptoms have at least improved with gabapentin.  Sees rheumatology   On Norco, tramadol    Gout   On allopurinol 200 mg daiy   Also on colchicine 0.6 mg BID  Sees rheumatology     Urinary incontinence  On oxybutynin    Nasal congestion  Takes flonase     HISTORY:  Past Medical History:   Diagnosis Date    A-fib (HCC)     Allergic rhinitis     Arthritis     Esophageal reflux     Essential hypertension     Hyperthyroidism     Obesity     Osteoarthritis       Past Surgical History:   Procedure Laterality Date    APPENDECTOMY            2 children    OTHER SURGICAL HISTORY      right hand surgery      No family history on file.   Social History:   Social History     Socioeconomic History    Marital status:    Tobacco Use    Smoking status: Never     Passive exposure: Never    Smokeless tobacco: Never   Vaping Use    Vaping Use: Never used   Substance and Sexual Activity    Alcohol use: Never    Drug use: Never        Medications (Active prior to today's visit):  Current Outpatient Medications   Medication Sig Dispense Refill    HYDROcodone-acetaminophen (NORCO) 5-325 MG Oral Tab Take 1 tablet by mouth every 8 (eight) hours as needed for Pain (severe pain). 30 tablet 0    gabapentin 300 MG Oral Cap Take 1 capsule (300 mg total) by mouth 2 (two) times daily as needed. 180 capsule 1    traMADol 50 MG Oral Tab Take 1 tablet (50 mg  total) by mouth every 6 (six) hours as needed for Pain. 120 tablet 2    colchicine 0.6 MG Oral Tab Take 1 tablet (0.6 mg total) by mouth 2 (two) times daily. 180 tablet 2    oxybutynin ER 10 MG Oral Tablet 24 Hr Take 1 tablet (10 mg total) by mouth daily. 90 tablet 0    fluticasone propionate 50 MCG/ACT Nasal Suspension 1 spray by Each Nare route daily. 16 g 0    allopurinol 100 MG Oral Tab Take 2 tablets (200 mg total) by mouth daily. For gout. 180 tablet 3    digoxin 0.125 MG Oral Tab Take 1 tablet (125 mcg total) by mouth daily.      metoprolol succinate ER 50 MG Oral Tablet 24 Hr Take 1 tablet (50 mg total) by mouth daily.      XARELTO 15 MG Oral Tab Take 1 tablet (15 mg total) by mouth daily with food.      Calcium Citrate-Vitamin D 1000-400 Oral Liquid Take 1 tablet by mouth daily.      Cholecalciferol (VITAMIN D) 2000 units Oral Tab Take 1 tablet by mouth daily.      Fexofenadine HCl 180 MG Oral Tab Take 1 tablet (180 mg total) by mouth daily.      Halobetasol Propionate 0.05 % External Cream as needed.      Multivitamin Chewtab, ADULT, Oral Chew Tab Chew 1 tablet by mouth daily.         Allergies:  Allergies   Allergen Reactions    Dust Mite Extract OTHER (SEE COMMENTS)     \"Nose gets stuffed up.\"  Hay fever         ROS:   Review of Systems   Neurological:         +neuropathy in feet     Otherwise see HPI    PHYSICAL EXAM:   /70 (BP Location: Left arm, Patient Position: Sitting, Cuff Size: adult)   Pulse 72   Resp 16   Ht 5' 3\" (1.6 m)   Wt 187 lb (84.8 kg)   SpO2 98%   BMI 33.13 kg/m²  Estimated body mass index is 33.13 kg/m² as calculated from the following:    Height as of this encounter: 5' 3\" (1.6 m).    Weight as of this encounter: 187 lb (84.8 kg).   Vital signs reviewed.Appears stated age, well groomed.    Physical Exam  Constitutional:       General: She is not in acute distress.  Cardiovascular:      Comments: +irregularly irregular  Pulmonary:      Effort: Pulmonary effort is normal.       Breath sounds: Normal breath sounds.   Musculoskeletal:      Comments: +ROM of feet intact, no significant tenderness to palpation of bilateral feet and ankles    Neurological:      Mental Status: She is alert.           ASSESSMENT/PLAN:     78 year old female presenting to establish care.       1. Atrial fibrillation with normal ventricular rate ([facility]), HTN  - continue digoxin, xarelto, and metoprolol   - per cardiology     2. Neuropathy  - continue gabapentin  - provided home physical therapy exercises   - if no improvement recommend f/u with neuro     3. Class 2 severe obesity due to excess calories with serious comorbidity and body mass index (BMI) of 39.0 to 39.9 in adult  ([facility])  - encourage well-balanced diet with fruits/vegetables, limited fried/fatty foods, and limited take-out   - encourage regular activity     4. Postmenopausal  - XR DEXA BONE DENSITOMETRY (CPT=77080); Future    5. Encounter to establish care    6. Urinary incontinence in female  - oxybutynin ER 10 MG Oral Tablet 24 Hr; Take 1 tablet (10 mg total) by mouth daily.  Dispense: 90 tablet; Refill: 0    7. Nasal congestion  - fluticasone propionate 50 MCG/ACT Nasal Suspension; 1 spray by Each Nare route daily.  Dispense: 16 g; Refill: 0    Follow-up: encouraged to schedule annual exam     Outcome: Patient verbalizes understanding. Patient is notified to call with any questions, complications, allergies, or worsening or changing symptoms.  Patient is to call with any side effects or complications from the treatments as a result of today.     Total length of visit/charting: approximately 30 min    Juan Diego Flores MD, 01/18/24, 10:27 AM      Please note that portions of this note may have been completed with a voice recognition program. Efforts were made to edit the dictations but occasionally words are mis-transcribed.

## 2024-01-18 NOTE — PROCEDURES
[address] x 2   Injections of both knees.  Left knee Osteoarthritis  Right knee osteoarthritis  After obtaining consent from the patient, and doing a timeout, and identifying the inside of both knees, both knees were cleansed medial aspect with Hibiclens and alcohol wipes.  Then the right knee was injected first with 1 cc 1% lidocaine and 40 mg of methylprednisolone, next the left knee was injected from a medial approach with 1 cc of 1% lidocaine and 40 mg of methylprednisolone.  Patient tolerated the bilateral knee injections without incident.

## 2024-01-18 NOTE — PROGRESS NOTES
[facility]  Dr. Owens Progress Note     Subjective:   Radha Brand is a(n) 78 year old female.   Current complaints: feeling good.  Has neuropathy,  Interested in gel injections of the knees.    History of osteoarthritis of the knee and fingers.   Would like injections in the knee.  Has chronic knee pain.  Has pain in left ankle and top of left foot and then the bottom of left foot.  Also right foot pain.    Objective:   /60   Pulse 78   Temp 98 °F (36.7 °C)   Resp 16   Ht 5' 3\" (1.6 m)   Wt 187 lb (84.8 kg)   SpO2 96%   BMI 33.13 kg/m²   Lungs clear heart normal sinus rhythm.  Knees bilaterally tender and hypertrophic.  Abdomen is obese.  Assessment:     Encounter Diagnoses   Name Primary?    Primary osteoarthritis of left knee Yes    Primary osteoarthritis of right knee     Heberden's nodes of both hands     Nicky nodes (DJD hand)     Class 2 severe obesity due to excess calories with serious comorbidity and body mass index (BMI) of 39.0 to 39.9 in adult  (HCC)     Acute idiopathic gout of right hand-right little finger pip joint      Plan:     Patient Instructions   Colchicine  0,6 mg up to twice a day for gout attack.   Gabapentin 300 mg twice a day for nerve pain.  Tramadol 50 mg every 6 hours as needed for pain.  Norco for severe pain 5 mg once and a while.  Today both knees injected with cortisone.  Go home rest x 24 hours.  Ice prn for 10 minutes.   My office will apply for Hyalgan gel shotw for your knees.  RTO 3 months.          Juan Diego Owens MD 1/18/2024 9:50 AM

## 2024-03-18 DIAGNOSIS — R09.81 NASAL CONGESTION: ICD-10-CM

## 2024-03-18 RX ORDER — FLUTICASONE PROPIONATE 50 MCG
1 SPRAY, SUSPENSION (ML) NASAL DAILY
Qty: 16 G | Refills: 1 | Status: SHIPPED | OUTPATIENT
Start: 2024-03-18

## 2024-03-18 NOTE — TELEPHONE ENCOUNTER
Last office visit: 1/18/24   Protocol: pass  Requested medication(s) are due for refill today: Yes   Requested medication(s) are on the active medication list same strength, form, dose/ sig:Yes  Requested medication(s) are managed by provider: Yes  Patient has already received a courtsey refill: Yes    NOV: 7/18/24

## 2024-04-01 ENCOUNTER — TELEPHONE (OUTPATIENT)
Dept: FAMILY MEDICINE CLINIC | Facility: CLINIC | Age: 79
End: 2024-04-01

## 2024-04-01 NOTE — TELEPHONE ENCOUNTER
Last OV: 1/18/24 Afib/estab care              Next OV:  4/1/24    Pt scheduled for cataract surgery with Dr James 4/2/24 at [facility].  Pt called our office to cancel pre op clearance appointment today.   [facility] surgery scheduler called back to verify that medical clearance is required and she will contact patient today.  Called [facility],  notified surgery scheduler that pt cancelled her pre op clearance appointment today.

## 2024-04-12 ENCOUNTER — VIRTUAL PHONE E/M (OUTPATIENT)
Dept: FAMILY MEDICINE CLINIC | Facility: CLINIC | Age: 79
End: 2024-04-12
Payer: MEDICARE

## 2024-04-12 DIAGNOSIS — J06.9 UPPER RESPIRATORY INFECTION, ACUTE: Primary | ICD-10-CM

## 2024-04-12 PROCEDURE — 99213 OFFICE O/P EST LOW 20 MIN: CPT | Performed by: STUDENT IN AN ORGANIZED HEALTH CARE EDUCATION/TRAINING PROGRAM

## 2024-04-12 RX ORDER — METHYLPREDNISOLONE 4 MG/1
TABLET ORAL
Qty: 21 EACH | Refills: 0 | Status: SHIPPED | OUTPATIENT
Start: 2024-04-12

## 2024-04-12 RX ORDER — FLUTICASONE PROPIONATE 50 MCG
2 SPRAY, SUSPENSION (ML) NASAL DAILY PRN
Qty: 16 G | Refills: 0 | Status: SHIPPED | OUTPATIENT
Start: 2024-04-12

## 2024-04-12 RX ORDER — AZELASTINE 1 MG/ML
2 SPRAY, METERED NASAL NIGHTLY PRN
Qty: 30 ML | Refills: 0 | Status: SHIPPED | OUTPATIENT
Start: 2024-04-12

## 2024-04-12 NOTE — PROGRESS NOTES
Telepoone Virtual Check-In    Radha Brand verbally consents to a Virtual Telephone Check-In service on 04/12/24. Patient understands and accepts financial responsibility for any deductible, co-insurance and/or co-pays associated with this service. This visit is conducted using Telephone with live, interactive audio.     I spoke with Radha Brand by secure Telephone chat, verified date of birth, and discussed their current concerns:   #  HPI  77 y/o female presenting for cold symptoms.     Her son recently had these symptoms and she got these symptoms about 5 days ago herself. She has been having cough, nasal congestion, and hoarse voice. Denies significant wheezing. Denies fevers, chills, nausea, or emesis. She states so far she has been taking OTC robitussin but has not helped much.     Review of Systems   Constitutional:  Negative for chills and fever.   HENT:          +hoarse voice   Respiratory:  Positive for cough. Negative for wheezing.    Gastrointestinal:  Negative for nausea and vomiting.        Otherwise see HPI    Reviewed Active Problems:  Patient Active Problem List    Diagnosis    Acute idiopathic gout of right hand-right little finger pip joint     5/10/23 acute gout attack.      Primary osteoarthritis of left knee    Primary osteoarthritis of right knee    Nicky nodes (DJD hand)    Heberden's nodes of both hands    Primary hypertension    Atrial fibrillation with normal ventricular rate (HCC)    Class 2 severe obesity due to excess calories with serious comorbidity and body mass index (BMI) of 39.0 to 39.9 in adult (HCC)    Urinary incontinence in female    Hay fever    Ganglion cyst of wrist, left      Reviewed Past Medical History:  Past Medical History:    A-fib (HCC)    Allergic rhinitis    Arthritis    Esophageal reflux    Essential hypertension    Hyperthyroidism    Obesity    Osteoarthritis      Reviewed Family History:  No family history on file.    Reviewed Social  History:  Social History     Socioeconomic History    Marital status:    Tobacco Use    Smoking status: Never     Passive exposure: Never    Smokeless tobacco: Never   Vaping Use    Vaping status: Never Used   Substance and Sexual Activity    Alcohol use: Never    Drug use: Never      Reviewed Current Medications:  Current Outpatient Medications   Medication Sig Dispense Refill    methylPREDNISolone (MEDROL) 4 MG Oral Tablet Therapy Pack As directed. 21 each 0    fluticasone propionate 50 MCG/ACT Nasal Suspension 2 sprays by Each Nare route daily as needed (for sinus congestion). 16 g 0    azelastine 0.1 % Nasal Solution 2 sprays by Nasal route nightly as needed (for sinus congestion). 30 mL 0    fluticasone propionate 50 MCG/ACT Nasal Suspension SPRAY 1 SPRAY IN EACH NOSTRIL ONCE DAILY 16 g 1    HYDROcodone-acetaminophen (NORCO) 5-325 MG Oral Tab Take 1 tablet by mouth every 8 (eight) hours as needed for Pain (severe pain). 30 tablet 0    gabapentin 300 MG Oral Cap Take 1 capsule (300 mg total) by mouth 2 (two) times daily as needed. 180 capsule 1    traMADol 50 MG Oral Tab Take 1 tablet (50 mg total) by mouth every 6 (six) hours as needed for Pain. 120 tablet 2    colchicine 0.6 MG Oral Tab Take 1 tablet (0.6 mg total) by mouth 2 (two) times daily. 180 tablet 2    oxybutynin ER 10 MG Oral Tablet 24 Hr Take 1 tablet (10 mg total) by mouth daily. 90 tablet 0    allopurinol 100 MG Oral Tab Take 2 tablets (200 mg total) by mouth daily. For gout. 180 tablet 3    digoxin 0.125 MG Oral Tab Take 1 tablet (125 mcg total) by mouth daily.      metoprolol succinate ER 50 MG Oral Tablet 24 Hr Take 1 tablet (50 mg total) by mouth daily.      XARELTO 15 MG Oral Tab Take 1 tablet (15 mg total) by mouth daily with food.      Calcium Citrate-Vitamin D 1000-400 Oral Liquid Take 1 tablet by mouth daily.      Cholecalciferol (VITAMIN D) 2000 units Oral Tab Take 1 tablet by mouth daily.      Fexofenadine HCl 180 MG Oral Tab Take  1 tablet (180 mg total) by mouth daily.      Halobetasol Propionate 0.05 % External Cream as needed.      Multivitamin Chewtab, ADULT, Oral Chew Tab Chew 1 tablet by mouth daily.              Assessment and Plan:  79 y/o female presenting for likely viral URI, however based on her description of symptoms there is likely a component of bronchial inflammation. Also likely has some associated laryngitis. Would recommend course of flonase/azelastine for nasal congestion, trial of medrol, and COVID/flu/RSV testing.     1. Upper respiratory infection, acute  - Recommended adequate hydration, humidifier use, honey/lemon mixture, steam inhalation, etc.   - SARS-CoV-2/Flu A and B/RSV by PCR ([facility]); Future  - methylPREDNISolone (MEDROL) 4 MG Oral Tablet Therapy Pack; As directed.  Dispense: 21 each; Refill: 0  - fluticasone propionate 50 MCG/ACT Nasal Suspension; 2 sprays by Each Nare route daily as needed (for sinus congestion).  Dispense: 16 g; Refill: 0  - azelastine 0.1 % Nasal Solution; 2 sprays by Nasal route nightly as needed (for sinus congestion).  Dispense: 30 mL; Refill: 0    Follow up: as needed    Duration of visit, including charting, in minutes: 20 min    Please note that the following visit was completed using two-way, real-time interactive audio and/or video communication.  This has been done in good jose to provide continuity of care in the best interest of the provider-patient relationship, due to the ongoing public health crisis/national emergency and because of restrictions of visitation.  There are limitations of this visit as no physical exam could be performed.  Every conscious effort was taken to allow for sufficient and adequate time.  This billing was spent on reviewing labs, medications, radiology tests and decision making.  Appropriate medical decision-making and tests are ordered as detailed in the plan of care above. Radha Brand understands video evaluation is not a substitute for in  person examination or emergency care. Patient advised to go to ER or call 911 for worsening symptoms or acute distress.     Juan Diego Flores MD, 04/12/24, 9:20 AM

## 2024-05-07 ENCOUNTER — OFFICE VISIT (OUTPATIENT)
Dept: RHEUMATOLOGY | Facility: CLINIC | Age: 79
End: 2024-05-07
Payer: MEDICARE

## 2024-05-07 VITALS
WEIGHT: 189 LBS | DIASTOLIC BLOOD PRESSURE: 72 MMHG | TEMPERATURE: 98 F | OXYGEN SATURATION: 99 % | HEIGHT: 63 IN | RESPIRATION RATE: 16 BRPM | HEART RATE: 86 BPM | SYSTOLIC BLOOD PRESSURE: 112 MMHG | BODY MASS INDEX: 33.49 KG/M2

## 2024-05-07 DIAGNOSIS — M15.2 BOUCHARD NODES (DJD HAND): Primary | ICD-10-CM

## 2024-05-07 DIAGNOSIS — M17.12 PRIMARY OSTEOARTHRITIS OF LEFT KNEE: ICD-10-CM

## 2024-05-07 DIAGNOSIS — M10.041 ACUTE IDIOPATHIC GOUT OF RIGHT HAND: ICD-10-CM

## 2024-05-07 DIAGNOSIS — M17.11 PRIMARY OSTEOARTHRITIS OF RIGHT KNEE: ICD-10-CM

## 2024-05-07 DIAGNOSIS — M15.1 HEBERDEN'S NODES OF BOTH HANDS: ICD-10-CM

## 2024-05-07 RX ORDER — GABAPENTIN 300 MG/1
300 CAPSULE ORAL 2 TIMES DAILY PRN
Qty: 180 CAPSULE | Refills: 1 | Status: SHIPPED | OUTPATIENT
Start: 2024-05-07

## 2024-05-07 RX ORDER — COLCHICINE 0.6 MG/1
0.6 TABLET ORAL 2 TIMES DAILY
Qty: 180 TABLET | Refills: 2 | Status: SHIPPED | OUTPATIENT
Start: 2024-05-07

## 2024-05-07 RX ORDER — ALLOPURINOL 100 MG/1
200 TABLET ORAL DAILY
Qty: 180 TABLET | Refills: 3 | Status: SHIPPED | OUTPATIENT
Start: 2024-05-07 | End: 2025-05-02

## 2024-05-07 RX ORDER — HYDROCODONE BITARTRATE AND ACETAMINOPHEN 5; 325 MG/1; MG/1
1 TABLET ORAL EVERY 8 HOURS PRN
Qty: 50 TABLET | Refills: 0 | Status: SHIPPED | OUTPATIENT
Start: 2024-05-07

## 2024-05-07 RX ORDER — TRAMADOL HYDROCHLORIDE 50 MG/1
50 TABLET ORAL EVERY 6 HOURS PRN
Qty: 120 TABLET | Refills: 2 | Status: SHIPPED | OUTPATIENT
Start: 2024-05-07

## 2024-05-07 RX ORDER — BRIMONIDINE TARTRATE 1 MG/ML
SOLUTION/ DROPS OPHTHALMIC
COMMUNITY

## 2024-05-07 RX ORDER — METHYLPREDNISOLONE ACETATE 40 MG/ML
40 INJECTION, SUSPENSION INTRA-ARTICULAR; INTRALESIONAL; INTRAMUSCULAR; SOFT TISSUE ONCE
Status: COMPLETED | OUTPATIENT
Start: 2024-05-07 | End: 2024-05-07

## 2024-05-07 RX ADMIN — METHYLPREDNISOLONE ACETATE 40 MG: 40 INJECTION, SUSPENSION INTRA-ARTICULAR; INTRALESIONAL; INTRAMUSCULAR; SOFT TISSUE at 15:53:00

## 2024-05-07 RX ADMIN — METHYLPREDNISOLONE ACETATE 40 MG: 40 INJECTION, SUSPENSION INTRA-ARTICULAR; INTRALESIONAL; INTRAMUSCULAR; SOFT TISSUE at 13:29:00

## 2024-05-07 NOTE — PROCEDURES
20610 x 2  Bilateral knee injections.  Primary osteoarthritis left knee.  Primary osteoarthritis right knee.  After obtaining consent, doing a timeout, identifying the medial aspects of both knees, the knees were cleansed with Hibiclens and alcohol wipes.  Then the right knee was injected with 40 mg of methylprednisolone and 1 cc of 1% lidocaine.  Medial approach.  Next the left knee was injected from a medial approach with 40 mg of methylprednisolone and 1 cc of 1% lidocaine.  Patient tolerated both injections without incident.

## 2024-05-07 NOTE — PROGRESS NOTES
[facility]  Dr. Owens Progress Note     Subjective:   Radha Brand is a(n) 78 year old female.   Current complaints:   Chief Complaint   Patient presents with    Follow - Up     LOV 1/18/2024.   Dealing with chronic knee pain due to osteoarthritis  of the knees.   Also OA of fingers.  Feeling ok today.  Had cataracts surgery, left eye 2 weeks ago, right eye 4 weeks ago. [facility].   Joint pain both knees, and left wrist, and right little finger.   No recent gout attacks.  Requesting knee injections.   Using Tramadol for mild pain occasional.  Rare use of Norco for severe pain.  On gabapentin twice a day.  Walking is painful.    Objective:   /72   Pulse 86   Temp 98 °F (36.7 °C)   Resp 16   Ht 5' 3\" (1.6 m)   Wt 189 lb (85.7 kg)   SpO2 99%   BMI 33.48 kg/m²   Lungs clear  Heart ok  Knees tender and hypertrophic  Right middle finger and right little finger have Nicky nodes.  Heberden node right middle finger.  Assessment:     Encounter Diagnoses   Name Primary?    Primary osteoarthritis of left knee     Acute idiopathic gout of right hand     Nicky nodes (DJD hand) Yes    Heberden's nodes of both hands     Primary osteoarthritis of right knee      Plan:     Patient Instructions   Today both knees were injected with cortisone, go home and rest.  Gabapentin  Allopurinol 200 mg per day to prevent gout/  If gout attack occurs use colchicine 0.6 mg twice a day.  Tramadol use 50 mg once and awhile for pain relief.  For severe pain take a Norco 5 mg instead of Tramadol.  Return to office 5-6 weeks - at that time hopefully we can give you a gel shot.          Juan Diego Owens MD 5/7/2024 1:02 PM

## 2024-05-07 NOTE — PATIENT INSTRUCTIONS
Today both knees were injected with cortisone, go home and rest.  Gabapentin use 300 mg twice a day.  Allopurinol 200 mg per day to prevent gout/  If gout attack occurs use colchicine 0.6 mg twice a day.  Tramadol use 50 mg once and awhile for pain relief.  For severe pain take a Norco 5 mg instead of Tramadol.  Return to office 5-6 weeks - at that time hopefully we can give you a gel shot.

## 2024-05-28 DIAGNOSIS — J06.9 UPPER RESPIRATORY INFECTION, ACUTE: ICD-10-CM

## 2024-05-28 RX ORDER — AZELASTINE HYDROCHLORIDE 137 UG/1
SPRAY, METERED NASAL
Qty: 30 ML | Refills: 1 | Status: SHIPPED | OUTPATIENT
Start: 2024-05-28

## 2024-05-28 NOTE — TELEPHONE ENCOUNTER
Last office visit: 01/18/2024  Protocol: pass    Requested medication(s) are due for refill today: Yes    Requested medication(s) are on the active medication list same strength, form, dose/ sig: Yes    Requested medication(s) are managed by provider: Yes    Patient has already received a courtsey refill: No    NOV: 07/18/2024  Last Labs: 05/24/23  Asked to Return: prn

## 2024-06-13 ENCOUNTER — TELEPHONE (OUTPATIENT)
Dept: RHEUMATOLOGY | Facility: CLINIC | Age: 79
End: 2024-06-13

## 2024-06-13 NOTE — TELEPHONE ENCOUNTER
Called patient back. Wanted to make sure Medicare Railroad will cover the knee gel injections.     Future Appointments   Date Time Provider Department Center   6/19/2024 10:20 AM Juan Diego Owens MD [facility]   7/18/2024 10:00 AM Juan Diego Flores MD [facility]     I have looked into this and found that Medicare Railroad functions the same as original Medicare for this patients plan. This is according to the [place] [employer], Medicare website; and the [facility] [place] website; and the [facility] Prior Authorization (PA) website. As well as other various sources. Thus this plan does not require further authorization for this service, same as other original Medicare plans.   Her AARP supplement should cover her co-pay as usual.     No further action required at this time. Gel is available in clinic for buy-and-bill for her upcoming appointment.     In previous visit Kevin was the plan for injections. Set aside 6 injections with her name.

## 2024-06-19 ENCOUNTER — TELEPHONE (OUTPATIENT)
Dept: RHEUMATOLOGY | Facility: CLINIC | Age: 79
End: 2024-06-19

## 2024-06-19 NOTE — TELEPHONE ENCOUNTER
Called pt to let her know she missed her appt today, and if she wanted to reschedule, and she hung up on me

## 2024-06-24 ENCOUNTER — TELEPHONE (OUTPATIENT)
Dept: RHEUMATOLOGY | Facility: CLINIC | Age: 79
End: 2024-06-24

## 2024-06-24 NOTE — TELEPHONE ENCOUNTER
Called pt to see if on there way to office for follow up appt. No answer, LMTCB. Provided office phone number.

## 2024-07-07 DIAGNOSIS — R32 URINARY INCONTINENCE IN FEMALE: ICD-10-CM

## 2024-07-08 RX ORDER — OXYBUTYNIN CHLORIDE 10 MG/1
10 TABLET, EXTENDED RELEASE ORAL DAILY
Qty: 90 TABLET | Refills: 0 | Status: SHIPPED | OUTPATIENT
Start: 2024-07-08

## 2024-07-08 NOTE — TELEPHONE ENCOUNTER
Last office visit: 1/18/2024   Protocol: pass  Requested medication(s) are due for refill today: yes  Requested medication(s) are on the active medication list same strength, form, dose/ sig: yes  Requested medication(s) are managed by provider: yes  Patient has already received a courtsey refill: no     NOV: 7/18/2024  Last Labs: 5/24/2023  Asked to Return: n/a

## 2024-07-15 ENCOUNTER — LAB ENCOUNTER (OUTPATIENT)
Dept: LAB | Facility: HOSPITAL | Age: 79
End: 2024-07-15
Attending: INTERNAL MEDICINE
Payer: MEDICARE

## 2024-07-15 DIAGNOSIS — R06.09 DOE (DYSPNEA ON EXERTION): ICD-10-CM

## 2024-07-15 DIAGNOSIS — I48.19 ATRIAL FIBRILLATION, PERSISTENT (HCC): Primary | ICD-10-CM

## 2024-07-15 DIAGNOSIS — I10 ESSENTIAL HYPERTENSION, BENIGN: ICD-10-CM

## 2024-07-15 LAB
ALBUMIN SERPL-MCNC: 3.5 G/DL (ref 3.4–5)
ALBUMIN/GLOB SERPL: 1.1 {RATIO} (ref 1–2)
ALP LIVER SERPL-CCNC: 67 U/L
ALT SERPL-CCNC: 25 U/L
ANION GAP SERPL CALC-SCNC: 7 MMOL/L (ref 0–18)
AST SERPL-CCNC: 16 U/L (ref 15–37)
BASOPHILS # BLD AUTO: 0.03 X10(3) UL (ref 0–0.2)
BASOPHILS NFR BLD AUTO: 0.5 %
BILIRUB SERPL-MCNC: 0.7 MG/DL (ref 0.1–2)
BUN BLD-MCNC: 18 MG/DL (ref 9–23)
CALCIUM BLD-MCNC: 9.3 MG/DL (ref 8.5–10.1)
CHLORIDE SERPL-SCNC: 108 MMOL/L (ref 98–112)
CHOLEST SERPL-MCNC: 192 MG/DL (ref ?–200)
CO2 SERPL-SCNC: 29 MMOL/L (ref 21–32)
CREAT BLD-MCNC: 1.25 MG/DL
DIGOXIN SERPL-MCNC: 0.6 NG/ML (ref 0.8–2)
EGFRCR SERPLBLD CKD-EPI 2021: 44 ML/MIN/1.73M2 (ref 60–?)
EOSINOPHIL # BLD AUTO: 0.27 X10(3) UL (ref 0–0.7)
EOSINOPHIL NFR BLD AUTO: 4.9 %
ERYTHROCYTE [DISTWIDTH] IN BLOOD BY AUTOMATED COUNT: 13.6 %
FASTING PATIENT LIPID ANSWER: YES
FASTING STATUS PATIENT QL REPORTED: YES
GLOBULIN PLAS-MCNC: 3.2 G/DL (ref 2.8–4.4)
GLUCOSE BLD-MCNC: 98 MG/DL (ref 70–99)
HCT VFR BLD AUTO: 40.2 %
HDLC SERPL-MCNC: 72 MG/DL (ref 40–59)
HGB BLD-MCNC: 13.1 G/DL
IMM GRANULOCYTES # BLD AUTO: 0.03 X10(3) UL (ref 0–1)
IMM GRANULOCYTES NFR BLD: 0.5 %
LDLC SERPL CALC-MCNC: 107 MG/DL (ref ?–100)
LYMPHOCYTES # BLD AUTO: 1.13 X10(3) UL (ref 1–4)
LYMPHOCYTES NFR BLD AUTO: 20.6 %
MCH RBC QN AUTO: 27.5 PG (ref 26–34)
MCHC RBC AUTO-ENTMCNC: 32.6 G/DL (ref 31–37)
MCV RBC AUTO: 84.3 FL
MONOCYTES # BLD AUTO: 0.51 X10(3) UL (ref 0.1–1)
MONOCYTES NFR BLD AUTO: 9.3 %
NEUTROPHILS # BLD AUTO: 3.52 X10 (3) UL (ref 1.5–7.7)
NEUTROPHILS # BLD AUTO: 3.52 X10(3) UL (ref 1.5–7.7)
NEUTROPHILS NFR BLD AUTO: 64.2 %
NONHDLC SERPL-MCNC: 120 MG/DL (ref ?–130)
NT-PROBNP SERPL-MCNC: 1153 PG/ML (ref ?–450)
OSMOLALITY SERPL CALC.SUM OF ELEC: 300 MOSM/KG (ref 275–295)
PLATELET # BLD AUTO: 225 10(3)UL (ref 150–450)
POTASSIUM SERPL-SCNC: 3.8 MMOL/L (ref 3.5–5.1)
PROT SERPL-MCNC: 6.7 G/DL (ref 6.4–8.2)
RBC # BLD AUTO: 4.77 X10(6)UL
SODIUM SERPL-SCNC: 144 MMOL/L (ref 136–145)
T4 FREE SERPL-MCNC: 1.2 NG/DL (ref 0.8–1.7)
TRIGL SERPL-MCNC: 72 MG/DL (ref 30–149)
TSI SER-ACNC: 0.1 MIU/ML (ref 0.36–3.74)
VLDLC SERPL CALC-MCNC: 12 MG/DL (ref 0–30)
WBC # BLD AUTO: 5.5 X10(3) UL (ref 4–11)

## 2024-07-15 PROCEDURE — 36415 COLL VENOUS BLD VENIPUNCTURE: CPT

## 2024-07-15 PROCEDURE — 84443 ASSAY THYROID STIM HORMONE: CPT

## 2024-07-15 PROCEDURE — 85025 COMPLETE CBC W/AUTO DIFF WBC: CPT

## 2024-07-15 PROCEDURE — 83880 ASSAY OF NATRIURETIC PEPTIDE: CPT

## 2024-07-15 PROCEDURE — 80061 LIPID PANEL: CPT

## 2024-07-15 PROCEDURE — 80053 COMPREHEN METABOLIC PANEL: CPT

## 2024-07-15 PROCEDURE — 84439 ASSAY OF FREE THYROXINE: CPT

## 2024-07-15 PROCEDURE — 80162 ASSAY OF DIGOXIN TOTAL: CPT

## 2024-09-01 DIAGNOSIS — J06.9 UPPER RESPIRATORY INFECTION, ACUTE: ICD-10-CM

## 2024-09-03 RX ORDER — AZELASTINE HYDROCHLORIDE 137 UG/1
SPRAY, METERED NASAL
Qty: 30 ML | Refills: 0 | Status: SHIPPED | OUTPATIENT
Start: 2024-09-03

## 2024-09-03 NOTE — TELEPHONE ENCOUNTER
Last office visit: 01/18/2024   Protocol: PASS    Requested medication(s) are due for refill today: Yes    Requested medication(s) are on the active medication list same strength, form, dose/ sig: Yes    Requested medication(s) are managed by provider: Yes    Patient has already received a courtsey refill: No    NOV: 9/23/24  Asked to Return: PRN

## 2024-09-12 ENCOUNTER — OFFICE VISIT (OUTPATIENT)
Dept: RHEUMATOLOGY | Facility: CLINIC | Age: 79
End: 2024-09-12
Payer: MEDICARE

## 2024-09-12 VITALS
SYSTOLIC BLOOD PRESSURE: 122 MMHG | HEIGHT: 63 IN | TEMPERATURE: 99 F | HEART RATE: 71 BPM | WEIGHT: 185 LBS | DIASTOLIC BLOOD PRESSURE: 62 MMHG | OXYGEN SATURATION: 96 % | BODY MASS INDEX: 32.78 KG/M2 | RESPIRATION RATE: 16 BRPM

## 2024-09-12 DIAGNOSIS — M17.12 PRIMARY OSTEOARTHRITIS OF LEFT KNEE: Primary | ICD-10-CM

## 2024-09-12 DIAGNOSIS — M15.1 HEBERDEN'S NODES OF BOTH HANDS: ICD-10-CM

## 2024-09-12 DIAGNOSIS — M17.11 PRIMARY OSTEOARTHRITIS OF RIGHT KNEE: ICD-10-CM

## 2024-09-12 PROCEDURE — 20610 DRAIN/INJ JOINT/BURSA W/O US: CPT | Performed by: INTERNAL MEDICINE

## 2024-09-12 RX ORDER — TRAMADOL HYDROCHLORIDE 50 MG/1
50 TABLET ORAL EVERY 6 HOURS PRN
Qty: 120 TABLET | Refills: 2 | Status: SHIPPED | OUTPATIENT
Start: 2024-09-12

## 2024-09-12 RX ORDER — HYDROCODONE BITARTRATE AND ACETAMINOPHEN 5; 325 MG/1; MG/1
1 TABLET ORAL EVERY 8 HOURS PRN
Qty: 50 TABLET | Refills: 0 | Status: SHIPPED | OUTPATIENT
Start: 2024-09-12

## 2024-09-12 NOTE — PROCEDURES
20610 x 2.  Bilateral knee injections with Hyalgan.  Diagnosis primary osteoarthritis left knee.  Primary osteoarthritis right knee.  After obtaining patient's consent and doing a timeout, both knees were identified from the medial aspect.  Then the right knee was first injected from medial aspect with 2 cc of Hyalgan which equals 20 mg without difficulty.  Then the left knee was injected from the medial aspect again 2 cc of Hyalgan was injected without difficulty.  Patient tolerated the bilateral knee injections without incident.

## 2024-09-12 NOTE — PATIENT INSTRUCTIONS
For pain use ES tylenol 500 mg 1-2 twice a day.    Tramadol 50  mg for more severe pain one every 6 hours.  Norco 5 mg as needed every 6 hours for severe pain not relieved by Tramadol.   Today both knees were injected with artificial joint fluid gel-Hyalgan.  Go home and rest.  Ice the knees if not needed.  Apply over-the-counter arthritis cream like diclofenac gel or Aspercreme.  Return to office for the second set of knee gel injections next week.

## 2024-09-12 NOTE — PROGRESS NOTES
[facility]  Dr. Owens Progress Note     Subjective:   Radha Brand is a(n) 78 year old female.   Current complaints:   Chief Complaint   Patient presents with    Follow - Up     LOV 5/7/2024   Patient states she is here for bilateral knee gel injections.   Rapid 3 score is a 6.7.   Chronic knee pain due to osteoarthritis of the knees.  Here today for Knee gel injections.  Uses tramadol for pain periodically.    Left shoulder is also painful.  Pulled her left arm somehow.  Objective:   /62   Pulse 71   Temp 98.6 °F (37 °C)   Resp 16   Ht 5' 3\" (1.6 m)   Wt 185 lb (83.9 kg)   SpO2 96%   BMI 32.77 kg/m²   Left knee hypertrophic and tender  no effusion  Right knee hypertrophic and tender  no effusion  Assessment:     Encounter Diagnoses   Name Primary?    Primary osteoarthritis of left knee Yes    Primary osteoarthritis of right knee     Heberden's nodes of both hands      Plan:     Patient Instructions   For pain use ES tylenol 500 mg 1-2 twice a day.    Tramadol 50  mg for more severe pain one every 6 hours.  Norco 5 mg as needed every 6 hours for severe pain not relieved by Tramadol.   Today both knees were injected with artificial joint fluid gel-Hyalgan.  Go home and rest.  Ice the knees if not needed.  Apply over-the-counter arthritis cream like diclofenac gel or Aspercreme.  Return to office for the second set of knee gel injections next week.        Juan Diego Owens MD 9/12/2024 2:47 PM

## 2024-09-19 ENCOUNTER — OFFICE VISIT (OUTPATIENT)
Dept: RHEUMATOLOGY | Facility: CLINIC | Age: 79
End: 2024-09-19
Payer: MEDICARE

## 2024-09-19 VITALS
SYSTOLIC BLOOD PRESSURE: 130 MMHG | TEMPERATURE: 98 F | BODY MASS INDEX: 33 KG/M2 | HEIGHT: 63 IN | HEART RATE: 85 BPM | DIASTOLIC BLOOD PRESSURE: 62 MMHG | RESPIRATION RATE: 16 BRPM | OXYGEN SATURATION: 94 %

## 2024-09-19 DIAGNOSIS — M17.12 PRIMARY OSTEOARTHRITIS OF LEFT KNEE: Primary | ICD-10-CM

## 2024-09-19 DIAGNOSIS — M17.11 PRIMARY OSTEOARTHRITIS OF RIGHT KNEE: ICD-10-CM

## 2024-09-19 PROCEDURE — 20610 DRAIN/INJ JOINT/BURSA W/O US: CPT | Performed by: INTERNAL MEDICINE

## 2024-09-19 NOTE — PROCEDURES
38923  Primary osteoarthritis left knee  Primary osteoarthritis right knee  Bilateral knee injections.  After obtaining consent from the patient, both knees were cleansed with Hibiclens and alcohol wipes.  Timeout was done medial aspect the knees identified.  Then each knee was injected.  First the right knee was injected with 2 cc of Hyalgan.  Then the left knee was injected from a medial approach with 2 cc of Hyalgan.  Patient tolerated the bilateral knee injections.

## 2024-09-19 NOTE — PROGRESS NOTES
[facility]  Dr. Owens Progress Note     Subjective:   Radha Brand is a(n) 78 year old female.   Current complaints:   Chief Complaint   Patient presents with    Follow - Up     Patient presents for her second round of gel injections in her knees.    Here today knee injections.    Second set of Hyalgan injections for OA of the Knees,  So far no major improvement in the knee pain.  Objective:   /62   Pulse 85   Temp 98.1 °F (36.7 °C)   Resp 16   Ht 5' 3\" (1.6 m)   SpO2 94%   BMI 32.77 kg/m²   Knees bilaterally tender and hypertrophic.  Assessment:     Encounter Diagnoses   Name Primary?    Primary osteoarthritis of left knee Yes    Primary osteoarthritis of right knee      Plan:     Patient Instructions   Today both knees were injected from the medial approach with Hyalgan.  Each knee received 2 ml of Hyalgan.  Go home rest.  Ice the knees prn.    Use either ES Tylenol 500 mg as neded for pain or Tramadol 50 mg one every 6 hours.  Return to office in one week for the third and  last set of Hyalgan injections.        Juan Diego Owens MD 9/19/2024 2:50 PM

## 2024-09-19 NOTE — PATIENT INSTRUCTIONS
Today both knees were injected from the medial approach with Hyalgan.  Each knee received 2 ml of Hyalgan.  Go home rest.  Ice the knees prn.    Use either ES Tylenol 500 mg as neded for pain or Tramadol 50 mg one every 6 hours.  Return to office in one week for the third and  last set of Hyalgan injections.

## 2024-09-26 ENCOUNTER — OFFICE VISIT (OUTPATIENT)
Dept: RHEUMATOLOGY | Facility: CLINIC | Age: 79
End: 2024-09-26
Payer: MEDICARE

## 2024-09-26 VITALS
WEIGHT: 185 LBS | RESPIRATION RATE: 20 BRPM | BODY MASS INDEX: 33 KG/M2 | HEART RATE: 63 BPM | SYSTOLIC BLOOD PRESSURE: 126 MMHG | DIASTOLIC BLOOD PRESSURE: 80 MMHG | OXYGEN SATURATION: 96 % | TEMPERATURE: 98 F

## 2024-09-26 DIAGNOSIS — M17.11 PRIMARY OSTEOARTHRITIS OF RIGHT KNEE: Primary | ICD-10-CM

## 2024-09-26 DIAGNOSIS — M17.12 PRIMARY OSTEOARTHRITIS OF LEFT KNEE: ICD-10-CM

## 2024-09-26 PROCEDURE — 20610 DRAIN/INJ JOINT/BURSA W/O US: CPT | Performed by: INTERNAL MEDICINE

## 2024-09-26 RX ORDER — HYDROCODONE BITARTRATE AND ACETAMINOPHEN 5; 325 MG/1; MG/1
1 TABLET ORAL EVERY 8 HOURS PRN
Qty: 50 TABLET | Refills: 0 | Status: SHIPPED | OUTPATIENT
Start: 2024-09-26

## 2024-09-26 NOTE — PROGRESS NOTES
[facility]  Dr. Owens Progress Note     Subjective:   Radha Brand is a(n) 78 year old female.   Current complaints:   Chief Complaint   Patient presents with    Injection     BL knee hylagen injections.     Here for bilateral gel shots of the knees.    This is the third a final set of Hyalgan injections for this time period.  Radha reports that the knees feel slightly better after the first 2 sets of gel shots.  Has chronic pain in her knees.  Needs a refill on her Norco.  Objective:   /80   Pulse 63   Temp 98 °F (36.7 °C)   Resp 20   Wt 185 lb (83.9 kg)   SpO2 96%   BMI 32.77 kg/m²   Left knee trace tender and hypertrophic.  Right knee also tender and hypertrophic.  Neither knee has an effusion.  Assessment:     Encounter Diagnoses   Name Primary?    Primary osteoarthritis of left knee     Primary osteoarthritis of right knee Yes     Plan:     Patient Instructions   Both knees today were injected with Hyalgan gel as treatment of underlying osteoarthritis of the knees.  Go home and rest.  Take it easy apply ice to the knee if needed.  If there is pain take extra strength Tylenol 500 mg 2 tablets as needed if there is severe pain take a Norco.  Return to office for recheck in 3 months at which time we could give you a cortisone shot.  Repeat gel shots will not be given until 6 months past.        Juan Diego Owens MD 9/26/2024 12:22 PM

## 2024-09-26 NOTE — PROCEDURES
20610 x 2  Bilateral Knee osteoarthritis.  Inject left knee osteoarthritis  Inject right knee osteoarthritis.   After obtaining consent of the patient, a timeout was done both knees identified and medial aspect of knees were cleansed with Betadine Hibiclens and alcohol wipes.  Then each knee was injected.  First the right knee was injected from medial approach and 20 mg  of Hyalgan was injected.  Next the left knee was injected from medial approach and 20 mg  of Hyalgan was injected.  Patient tolerated the bilateral knee injections.   Prescription sent as requested.

## 2024-09-26 NOTE — PATIENT INSTRUCTIONS
Both knees today were injected with Hyalgan gel as treatment of underlying osteoarthritis of the knees.  Go home and rest.  Take it easy apply ice to the knee if needed.  If there is pain take extra strength Tylenol 500 mg 2 tablets as needed if there is severe pain take a Norco.  Return to office for recheck in 3 months at which time we could give you a cortisone shot.  Repeat gel shots will not be given until 6 months past.

## 2024-10-02 ENCOUNTER — OFFICE VISIT (OUTPATIENT)
Dept: RHEUMATOLOGY | Facility: CLINIC | Age: 79
End: 2024-10-02
Payer: MEDICARE

## 2024-10-02 VITALS
RESPIRATION RATE: 20 BRPM | DIASTOLIC BLOOD PRESSURE: 82 MMHG | HEART RATE: 95 BPM | TEMPERATURE: 97 F | HEIGHT: 63 IN | BODY MASS INDEX: 32.78 KG/M2 | WEIGHT: 185 LBS | OXYGEN SATURATION: 96 % | SYSTOLIC BLOOD PRESSURE: 118 MMHG

## 2024-10-02 DIAGNOSIS — G89.29 CHRONIC LEFT SHOULDER PAIN: Primary | ICD-10-CM

## 2024-10-02 DIAGNOSIS — M25.512 CHRONIC LEFT SHOULDER PAIN: Primary | ICD-10-CM

## 2024-10-02 DIAGNOSIS — M19.012 PRIMARY OSTEOARTHRITIS OF LEFT SHOULDER: ICD-10-CM

## 2024-10-02 PROCEDURE — 20610 DRAIN/INJ JOINT/BURSA W/O US: CPT | Performed by: INTERNAL MEDICINE

## 2024-10-02 RX ORDER — METHYLPREDNISOLONE ACETATE 40 MG/ML
40 INJECTION, SUSPENSION INTRA-ARTICULAR; INTRALESIONAL; INTRAMUSCULAR; SOFT TISSUE ONCE
Status: COMPLETED | OUTPATIENT
Start: 2024-10-02 | End: 2024-10-02

## 2024-10-02 RX ADMIN — METHYLPREDNISOLONE ACETATE 40 MG: 40 INJECTION, SUSPENSION INTRA-ARTICULAR; INTRALESIONAL; INTRAMUSCULAR; SOFT TISSUE at 10:14:00

## 2024-10-02 NOTE — PATIENT INSTRUCTIONS
Left shoulder injection anteriorly done with cortisone.  Go home rest.  Ice the left shoulder if needed.  Use ES Tylenol as needed for pain 500 mg  dose.'  Return to office 3 months for recheck.

## 2024-10-02 NOTE — PROCEDURES
77272  Left shoulder pain  Left shoulder osteoarthritis.   Left shoulder injection .  After obtaining a consent from the patient, a timeout was done, left shoulder identified.  Anterior aspect left shoulder cleaned with Hibiclens alcohol wipes.  Then the left shoulder was injected anteriorly with 40 mg of methylprednisolone and 1 cc of 1% lidocaine.  Patient tolerated the left shoulder injection without incident.

## 2024-10-02 NOTE — PROGRESS NOTES
[facility]  Dr. Owens Progress Note     Subjective:   Radha Brand is a(n) 78 year old female.   Current complaints:   Chief Complaint   Patient presents with    Injection     Patient here for Left shoulder injection , patient c/o right shoulder pain also  Rapid 3 score High Severity 18=6.0   S/p bilteral knee gel injections with Hyalgan, finished   9/26/24.  Here today due to left shoulder pain. Requests left shoulder injection.    Objective:   /82   Pulse 95   Temp 97.3 °F (36.3 °C)   Resp 20   Ht 5' 3\" (1.6 m)   Wt 185 lb (83.9 kg)   SpO2 96%   BMI 32.77 kg/m²   Left shoulder tender anteriorly .  Left shoulder limited elevation.   Assessment:     Encounter Diagnoses   Name Primary?    Chronic left shoulder pain Yes    Primary osteoarthritis of left shoulder      Plan:     Patient Instructions   Left shoulder injection anteriorly done with cortisone.  Go home rest.  Ice the left shoulder if needed.  Use ES Tylenol as needed for pain 500 mg  dose.'  Return to office 3 months for recheck.         Juan Diego Owens MD 10/2/2024 9:59 AM

## 2024-10-04 DIAGNOSIS — R32 URINARY INCONTINENCE IN FEMALE: ICD-10-CM

## 2024-10-04 RX ORDER — OXYBUTYNIN CHLORIDE 10 MG/1
10 TABLET, EXTENDED RELEASE ORAL DAILY
Qty: 90 TABLET | Refills: 0 | Status: SHIPPED | OUTPATIENT
Start: 2024-10-04

## 2024-10-04 NOTE — TELEPHONE ENCOUNTER
Last office visit: 4/12/24   Protocol: pass  Requested medication(s) are due for refill today: yes  Requested medication(s) are on the active medication list same strength, form, dose/ sig: yes  Requested medication(s) are managed by provider: yes  Patient has already received a courtsey refill: no    NOV: 11/11/24  Last Labs: 7/15/24  Asked to Return: as needed

## 2024-10-22 DIAGNOSIS — J06.9 UPPER RESPIRATORY INFECTION, ACUTE: ICD-10-CM

## 2024-10-22 RX ORDER — AZELASTINE HYDROCHLORIDE 137 UG/1
SPRAY, METERED NASAL
Qty: 30 ML | Refills: 1 | Status: SHIPPED | OUTPATIENT
Start: 2024-10-22

## 2024-10-22 NOTE — TELEPHONE ENCOUNTER
Last office visit: 1/18/2024   Protocol: pass  Requested medication(s) are due for refill today: yes  Requested medication(s) are on the active medication list same strength, form, dose/ sig: yes  Requested medication(s) are managed by provider: yes  Patient has already received a courtsey refill: no    NOV: 11/11/2024  Last Labs: 7/15/2024  Asked to Return: none

## 2025-01-01 DIAGNOSIS — R32 URINARY INCONTINENCE IN FEMALE: ICD-10-CM

## 2025-01-02 RX ORDER — OXYBUTYNIN CHLORIDE 10 MG/1
10 TABLET, EXTENDED RELEASE ORAL DAILY
Qty: 90 TABLET | Refills: 0 | Status: SHIPPED | OUTPATIENT
Start: 2025-01-02

## 2025-01-02 NOTE — TELEPHONE ENCOUNTER
Last office visit: 1/18/2024   Protocol: pass  Requested medication(s) are due for refill today: yes  Requested medication(s) are on the active medication list same strength, form, dose/ sig: yes  Requested medication(s) are managed by provider: yes  Patient has already received a courtsey refill: no    NOV: 1/7/2025  Last Labs: 7/15/2024  Asked to Return: if symptoms fail to improve

## 2025-01-07 ENCOUNTER — OFFICE VISIT (OUTPATIENT)
Dept: RHEUMATOLOGY | Facility: CLINIC | Age: 80
End: 2025-01-07
Payer: MEDICARE

## 2025-01-07 VITALS
BODY MASS INDEX: 32.78 KG/M2 | SYSTOLIC BLOOD PRESSURE: 162 MMHG | WEIGHT: 185 LBS | TEMPERATURE: 98 F | HEART RATE: 88 BPM | OXYGEN SATURATION: 96 % | HEIGHT: 63 IN | DIASTOLIC BLOOD PRESSURE: 86 MMHG | RESPIRATION RATE: 16 BRPM

## 2025-01-07 DIAGNOSIS — Z23 NEED FOR INFLUENZA VACCINATION: ICD-10-CM

## 2025-01-07 DIAGNOSIS — M17.11 PRIMARY OSTEOARTHRITIS OF RIGHT KNEE: Primary | ICD-10-CM

## 2025-01-07 DIAGNOSIS — M15.1 HEBERDEN'S NODES OF BOTH HANDS: ICD-10-CM

## 2025-01-07 DIAGNOSIS — G89.29 CHRONIC LEFT SHOULDER PAIN: ICD-10-CM

## 2025-01-07 DIAGNOSIS — M1A.0410 IDIOPATHIC CHRONIC GOUT OF RIGHT HAND WITHOUT TOPHUS: ICD-10-CM

## 2025-01-07 DIAGNOSIS — M25.512 CHRONIC LEFT SHOULDER PAIN: ICD-10-CM

## 2025-01-07 DIAGNOSIS — M17.12 PRIMARY OSTEOARTHRITIS OF LEFT KNEE: ICD-10-CM

## 2025-01-07 DIAGNOSIS — M19.012 PRIMARY OSTEOARTHRITIS OF LEFT SHOULDER: ICD-10-CM

## 2025-01-07 DIAGNOSIS — I48.91 ATRIAL FIBRILLATION WITH NORMAL VENTRICULAR RATE (HCC): ICD-10-CM

## 2025-01-07 DIAGNOSIS — Z79.01 ANTICOAGULATED: ICD-10-CM

## 2025-01-07 PROBLEM — M1A.0420 IDIOPATHIC CHRONIC GOUT OF LEFT HAND WITHOUT TOPHUS: Status: ACTIVE | Noted: 2025-01-07

## 2025-01-07 PROCEDURE — 20610 DRAIN/INJ JOINT/BURSA W/O US: CPT | Performed by: INTERNAL MEDICINE

## 2025-01-07 PROCEDURE — 99213 OFFICE O/P EST LOW 20 MIN: CPT | Performed by: INTERNAL MEDICINE

## 2025-01-07 RX ORDER — METHYLPREDNISOLONE ACETATE 40 MG/ML
40 INJECTION, SUSPENSION INTRA-ARTICULAR; INTRALESIONAL; INTRAMUSCULAR; SOFT TISSUE ONCE
Status: COMPLETED | OUTPATIENT
Start: 2025-01-07 | End: 2025-01-07

## 2025-01-07 RX ORDER — COLCHICINE 0.6 MG/1
0.6 TABLET ORAL 2 TIMES DAILY
Qty: 180 TABLET | Refills: 2 | Status: SHIPPED | OUTPATIENT
Start: 2025-01-07

## 2025-01-07 RX ORDER — TRAMADOL HYDROCHLORIDE 50 MG/1
50 TABLET ORAL EVERY 6 HOURS PRN
Qty: 120 TABLET | Refills: 2 | Status: SHIPPED | OUTPATIENT
Start: 2025-01-07

## 2025-01-07 RX ORDER — HYDROCODONE BITARTRATE AND ACETAMINOPHEN 5; 325 MG/1; MG/1
1 TABLET ORAL EVERY 8 HOURS PRN
Qty: 50 TABLET | Refills: 0 | Status: CANCELLED | OUTPATIENT
Start: 2025-01-07

## 2025-01-07 RX ORDER — ALLOPURINOL 100 MG/1
200 TABLET ORAL DAILY
Qty: 180 TABLET | Refills: 3 | Status: SHIPPED | OUTPATIENT
Start: 2025-01-07 | End: 2026-01-02

## 2025-01-07 RX ADMIN — METHYLPREDNISOLONE ACETATE 40 MG: 40 INJECTION, SUSPENSION INTRA-ARTICULAR; INTRALESIONAL; INTRAMUSCULAR; SOFT TISSUE at 16:43:00

## 2025-01-07 RX ADMIN — METHYLPREDNISOLONE ACETATE 40 MG: 40 INJECTION, SUSPENSION INTRA-ARTICULAR; INTRALESIONAL; INTRAMUSCULAR; SOFT TISSUE at 17:46:00

## 2025-01-07 NOTE — PROCEDURES
20610 x 2  Left Shoulder injection  Right Knee injection  Primary osteoarthritis left shoulder  Primary ostearthritis right knee.   After obtaining consent from the patient, timeout was done.  Left shoulder identified where the pain was which uses in the deltoid area.  This area was then cleaned with alcohol wipes and Hibiclens.  Right knee was cleaned medially with Hibiclens and alcohol wipes.  Then the left shoulder was injected with 40 mg of methylprednisolone and 1 cc of 1% lidocaine laterally at the deltoid area.  Next the right knee was injected medially with 40 mg of methylprednisolone and 1 cc of 1% lidocaine.  Patient tolerated the 2 injections without incident.

## 2025-01-07 NOTE — PROGRESS NOTES
[facility]  Dr. Owens Progress Note     Subjective:   Radha Brand is a(n) 79 year old female.   Current complaints:   Chief Complaint   Patient presents with    Osteoarthritis    Follow - Up     LOV: 10/2/24  Patient is here for a follow up visit. Patient states that she has pain in her shoulder (9/10). Also has pain in her knees (9/10) and fingers (5/10).   Rapid 3:    History of osteoarthritis of the knees and osteoarthritis of the left shoulder.  Status post gel injections with Hyalgan and September 2024.  Status post cortisone injection left shoulder October 2024. C/o pain in the left shoulder.  C/o pain in both knees.  Pain is chronic and worse in the left shoulder.   On blood thinner for atrial fibrillation-Xarelto   Right knee pain > left knee pain. Requesting injections of right knee and left shoulder.   Objective:   BP (!) 162/86   Pulse 88   Temp 98.1 °F (36.7 °C)   Resp 16   Ht 5' 3\" (1.6 m)   Wt 185 lb (83.9 kg)   SpO2 96%   BMI 32.77 kg/m²   Lungs clear   Heart nsr   Left shoulder tender left shoulder has decreased range of motion.  Tenderness is significant over the deltoid area of the left shoulder.  Right knee hypertrophic and tender.  No effusion.  Left knee is also hypertrophic.  Hands have Heberden's nodes.  Assessment:     Encounter Diagnoses   Name Primary?    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  Also the right knee was injected with cortisone.  Go home and rest.  Apply ice to either the shoulder or knee if you need to.  He can also apply over-the-counter diclofenac  gel or Aspercreme 2-3 times a day to the same areas.  For mild pain take extra strength Tylenol 500 mg 1 or 2 twice a day.  For more severe pain take tramadol 50 mg 1 every 6 hours.  Do not use any ibuprofen, Aleve, or aspirin since you are on Xarelto as a blood thinner.  Return to office for recheck in 3 months.        Juan Diego Owens MD 1/7/2025 4:27 PM

## 2025-04-10 DIAGNOSIS — R32 URINARY INCONTINENCE IN FEMALE: ICD-10-CM

## 2025-04-10 RX ORDER — OXYBUTYNIN CHLORIDE 10 MG/1
10 TABLET, EXTENDED RELEASE ORAL DAILY
Qty: 90 TABLET | Refills: 0 | OUTPATIENT
Start: 2025-04-10

## 2025-04-14 ENCOUNTER — TELEPHONE (OUTPATIENT)
Dept: FAMILY MEDICINE CLINIC | Facility: CLINIC | Age: 80
End: 2025-04-14

## 2025-04-14 ENCOUNTER — TELEPHONE (OUTPATIENT)
Dept: RHEUMATOLOGY | Facility: CLINIC | Age: 80
End: 2025-04-14

## 2025-04-14 ENCOUNTER — TELEPHONE (OUTPATIENT)
Facility: CLINIC | Age: 80
End: 2025-04-14

## 2025-04-14 DIAGNOSIS — R32 URINARY INCONTINENCE IN FEMALE: ICD-10-CM

## 2025-04-14 RX ORDER — OXYBUTYNIN CHLORIDE 10 MG/1
10 TABLET, EXTENDED RELEASE ORAL DAILY
Qty: 90 TABLET | Refills: 1 | Status: SHIPPED | OUTPATIENT
Start: 2025-04-14

## 2025-04-14 NOTE — TELEPHONE ENCOUNTER
Last office visit: 1/18/24   Protocol: pass  Requested medication(s) are due for refill today: yes  Requested medication(s) are on the active medication list same strength, form, dose/ sig: yes  Requested medication(s) are managed by provider: yes  Patient has already received a courtsey refill: yes    NOV: 5/19/25    Asked to Return: annual exam

## 2025-04-15 ENCOUNTER — TELEPHONE (OUTPATIENT)
Facility: CLINIC | Age: 80
End: 2025-04-15

## 2025-04-23 ENCOUNTER — TELEPHONE (OUTPATIENT)
Dept: FAMILY MEDICINE CLINIC | Facility: CLINIC | Age: 80
End: 2025-04-23

## 2025-04-29 ENCOUNTER — OFFICE VISIT (OUTPATIENT)
Dept: FAMILY MEDICINE CLINIC | Facility: CLINIC | Age: 80
End: 2025-04-29
Payer: MEDICARE

## 2025-04-29 VITALS
HEART RATE: 103 BPM | RESPIRATION RATE: 22 BRPM | DIASTOLIC BLOOD PRESSURE: 80 MMHG | OXYGEN SATURATION: 97 % | SYSTOLIC BLOOD PRESSURE: 138 MMHG | TEMPERATURE: 98 F

## 2025-04-29 DIAGNOSIS — R05.1 ACUTE COUGH: Primary | ICD-10-CM

## 2025-04-29 PROCEDURE — 99213 OFFICE O/P EST LOW 20 MIN: CPT | Performed by: STUDENT IN AN ORGANIZED HEALTH CARE EDUCATION/TRAINING PROGRAM

## 2025-04-29 PROCEDURE — 87637 SARSCOV2&INF A&B&RSV AMP PRB: CPT | Performed by: STUDENT IN AN ORGANIZED HEALTH CARE EDUCATION/TRAINING PROGRAM

## 2025-04-29 RX ORDER — ROSUVASTATIN CALCIUM 5 MG/1
TABLET, COATED ORAL
COMMUNITY
Start: 2025-02-26

## 2025-04-29 RX ORDER — AZELASTINE 1 MG/ML
2 SPRAY, METERED NASAL NIGHTLY PRN
Qty: 30 ML | Refills: 0 | Status: SHIPPED | OUTPATIENT
Start: 2025-04-29

## 2025-04-29 RX ORDER — RIVAROXABAN 20 MG/1
20 TABLET, FILM COATED ORAL DAILY
COMMUNITY
Start: 2025-04-14

## 2025-04-29 RX ORDER — FLUTICASONE PROPIONATE 50 MCG
2 SPRAY, SUSPENSION (ML) NASAL DAILY PRN
Qty: 16 G | Refills: 0 | Status: SHIPPED | OUTPATIENT
Start: 2025-04-29

## 2025-04-29 RX ORDER — METOPROLOL SUCCINATE 25 MG/1
25 TABLET, EXTENDED RELEASE ORAL DAILY
COMMUNITY
Start: 2025-04-10

## 2025-04-29 RX ORDER — EZETIMIBE 10 MG/1
10 TABLET ORAL DAILY
COMMUNITY
Start: 2025-04-21

## 2025-04-29 NOTE — PROGRESS NOTES
[facility] Office Note    HPI:     Radha Brand is a 79 year old female presenting for cough.     Started 2 days ago. Denies fever. Has slight congestion. Her son apparently is sick as well. Denies wheezing. Has phlegm with cough, mostly clear. Has not tried anything yet to help with symptoms other than cough drops.     HISTORY:  Past Medical History[1]   Past Surgical History[2]   Family History[3]   Social History: Short Social Hx on File[4]     Medications (Active prior to today's visit):  Current Medications[5]    Allergies:  Allergies[6]      ROS:   Review of Systems   Constitutional:  Negative for chills and fever.   HENT:  Positive for congestion.    Respiratory:  Positive for cough.      Otherwise see HPI    PHYSICAL EXAM:   /80   Pulse 103   Temp 97.8 °F (36.6 °C)   Resp 22   SpO2 97%  Estimated body mass index is 32.77 kg/m² as calculated from the following:    Height as of 1/7/25: 5' 3\" (1.6 m).    Weight as of 1/7/25: 185 lb (83.9 kg).   Vital signs reviewed.Appears stated age, well groomed.    Physical Exam  Constitutional:       General: She is not in acute distress.  Cardiovascular:      Comments: +irregularly irregular (patient with hx of afib)  Pulmonary:      Effort: Pulmonary effort is normal.      Breath sounds: Normal breath sounds.   Neurological:      Mental Status: She is alert.           ASSESSMENT/PLAN:     79 year old female presenting for cough likely secondary to viral URI.      1. Acute cough  - Recommended adequate hydration, humidifier use, honey/lemon mixture, steam inhalation etc.  - nasal sprays as below for possible postnasal drip  - fluticasone propionate 50 MCG/ACT Nasal Suspension; 2 sprays by Each Nare route daily as needed (for sinus congestion).  Dispense: 16 g; Refill: 0  - azelastine 0.1 % Nasal Solution; 2 sprays by Nasal route nightly as needed (for sinus congestion).  Dispense: 30 mL; Refill: 0  - SARS-CoV-2/Flu A and B/RSV by  PCR ([facility])  - if not improving after 7 days can consider possible abx for superimposed bacterial infection    Follow-up: as needed    Outcome: Patient verbalizes understanding. Patient is notified to call with any questions, complications, allergies, or worsening or changing symptoms.  Patient is to call with any side effects or complications from the treatments as a result of today.     Total length of visit/charting: approximately 27 min    Juan Diego Flores MD, 25, 9:40 AM      Please note that portions of this note may have been completed with a voice recognition program. Efforts were made to edit the dictations but occasionally words are mis-transcribed.       [1]   Past Medical History:   A-fib (HCC)    Allergic rhinitis    Arthritis    Esophageal reflux    Essential hypertension    Hyperthyroidism    Obesity    Osteoarthritis   [2]   Past Surgical History:  Procedure Laterality Date    Appendectomy            2 children    Other surgical history      right hand surgery   [3] History reviewed. No pertinent family history.  [4]   Social History  Socioeconomic History    Marital status:    Tobacco Use    Smoking status: Never     Passive exposure: Never    Smokeless tobacco: Never   Vaping Use    Vaping status: Never Used   Substance and Sexual Activity    Alcohol use: Never    Drug use: Never   [5]   Current Outpatient Medications   Medication Sig Dispense Refill    XARELTO 20 MG Oral Tab Take 1 tablet (20 mg total) by mouth daily.      metoprolol succinate ER 25 MG Oral Tablet 24 Hr Take 1 tablet (25 mg total) by mouth daily.      rosuvastatin 5 MG Oral Tab rosuvastatin 5 mg tablet, [RxNorm: 408421]      ezetimibe 10 MG Oral Tab Take 1 tablet (10 mg total) by mouth daily.      fluticasone propionate 50 MCG/ACT Nasal Suspension 2 sprays by Each Nare route daily as needed (for sinus congestion). 16 g 0    azelastine 0.1 % Nasal Solution 2 sprays by Nasal route nightly as needed (for sinus  congestion). 30 mL 0    oxybutynin ER 10 MG Oral Tablet 24 Hr Take 1 tablet (10 mg total) by mouth daily. 90 tablet 1    allopurinol 100 MG Oral Tab Take 2 tablets (200 mg total) by mouth daily. For gout. 180 tablet 3    colchicine 0.6 MG Oral Tab Take 1 tablet (0.6 mg total) by mouth 2 (two) times daily. 180 tablet 2    traMADol 50 MG Oral Tab Take 1 tablet (50 mg total) by mouth every 6 (six) hours as needed for Pain. 120 tablet 2    HYDROcodone-acetaminophen (NORCO) 5-325 MG Oral Tab Take 1 tablet by mouth every 8 (eight) hours as needed for Pain (severe pain). 50 tablet 0    fluticasone propionate 50 MCG/ACT Nasal Suspension SPRAY 1 SPRAY IN EACH NOSTRIL ONCE DAILY 16 g 1    digoxin 0.125 MG Oral Tab Take 1 tablet (125 mcg total) by mouth daily.      Calcium Citrate-Vitamin D 1000-400 Oral Liquid Take 1 tablet by mouth daily.      Cholecalciferol (VITAMIN D) 2000 units Oral Tab Take 1 tablet by mouth daily.      Fexofenadine HCl 180 MG Oral Tab Take 1 tablet (180 mg total) by mouth daily.      Halobetasol Propionate 0.05 % External Cream as needed.      Multivitamin Chewtab, ADULT, Oral Chew Tab Chew 1 tablet by mouth daily.      gabapentin 300 MG Oral Cap Take 1 capsule (300 mg total) by mouth 2 (two) times daily as needed. (Patient not taking: Reported on 4/29/2025) 180 capsule 1    metoprolol succinate ER 50 MG Oral Tablet 24 Hr Take 1 tablet (50 mg total) by mouth daily. (Patient not taking: Reported on 4/29/2025)     [6]   Allergies  Allergen Reactions    Dust Mite Extract OTHER (SEE COMMENTS)     \"Nose gets stuffed up.\"  Hay fever

## 2025-04-29 NOTE — PATIENT INSTRUCTIONS
1) Take fluticasone nasal spray (aka Flonase) in the morning for 7-10 days    2) Take azelastine nasal spray at night for 7-10 days    3) Drink water regularly     4) Use a humidifier overnight    5) Try steam inhalation daily     6) Try honey/lemon mix    7) Try DayQuil Vapocool over the counter (available at most pharmacies)

## 2025-04-30 LAB
FLUAV + FLUBV RNA SPEC NAA+PROBE: NOT DETECTED
FLUAV + FLUBV RNA SPEC NAA+PROBE: NOT DETECTED
RSV RNA SPEC NAA+PROBE: NOT DETECTED
SARS-COV-2 RNA RESP QL NAA+PROBE: NOT DETECTED

## 2025-05-01 ENCOUNTER — PATIENT MESSAGE (OUTPATIENT)
Dept: FAMILY MEDICINE CLINIC | Facility: CLINIC | Age: 80
End: 2025-05-01

## 2025-05-01 NOTE — TELEPHONE ENCOUNTER
Pt called office to review results   She says she still isn't feeling better and is looking for abx. Per Dr REVELES's note, pt to reach out to office if symptoms don't improve after 7 days   I advised pt to continue at home remedies and let us know by Monday if still not feeling well. I also advised if she feels like she gets worse over the weekend, develops new symptoms, or feels like she needs treatment sooner to go to [facility]IC or UC. She verbalized understanding   Santiago

## 2025-06-14 DIAGNOSIS — R05.1 ACUTE COUGH: ICD-10-CM

## 2025-06-16 RX ORDER — AZELASTINE HYDROCHLORIDE 137 UG/1
SPRAY, METERED NASAL
Qty: 30 ML | Refills: 0 | Status: SHIPPED | OUTPATIENT
Start: 2025-06-16

## 2025-06-16 NOTE — TELEPHONE ENCOUNTER
Last office visit: 4/29/25   Protocol: pass  Requested medication(s) are due for refill today: yes  Requested medication(s) are on the active medication list same strength, form, dose/ sig: yes  Requested medication(s) are managed by provider: yes  Patient has already received a courtsey refill: no    NOV: 7/7/25    Asked to Return: prn

## 2025-07-06 ENCOUNTER — TELEPHONE (OUTPATIENT)
Dept: FAMILY MEDICINE CLINIC | Facility: CLINIC | Age: 80
End: 2025-07-06

## 2025-07-06 NOTE — TELEPHONE ENCOUNTER
Pt called enrrique at 10:13 am on Sunday 7/6/25 to notify our office that she is needing to cancel her appt on 7/7/25 at 10am. She said she will call back to reschedule. She was due for MAW. Please reach out to reschedule. Thank you.

## 2025-07-14 DIAGNOSIS — M17.12 PRIMARY OSTEOARTHRITIS OF LEFT KNEE: ICD-10-CM

## 2025-07-14 DIAGNOSIS — M17.11 PRIMARY OSTEOARTHRITIS OF RIGHT KNEE: ICD-10-CM

## 2025-07-14 RX ORDER — TRAMADOL HYDROCHLORIDE 50 MG/1
50 TABLET ORAL EVERY 6 HOURS PRN
Qty: 120 TABLET | Refills: 0 | Status: SHIPPED | OUTPATIENT
Start: 2025-07-14

## 2025-07-14 NOTE — TELEPHONE ENCOUNTER
Last office visit: 1/7/2025    Next Rheum Apt:Visit date not found    Last fill: 1/7/2025, Quantity 120, Refills 2    Labs:   Lab Results   Component Value Date    CREATSERUM 1.25 (H) 07/15/2024    ALKPHO 67 07/15/2024    AST 16 07/15/2024    ALT 25 07/15/2024    BILT 0.7 07/15/2024    TP 6.7 07/15/2024    ALB 3.5 07/15/2024       Lab Results   Component Value Date    WBC 5.5 07/15/2024    HGB 13.1 07/15/2024    .0 07/15/2024    NEPRELIM 3.52 07/15/2024    NEPERCENT 64.2 07/15/2024    LYPERCENT 20.6 07/15/2024    NE 3.52 07/15/2024    LYMABS 1.13 07/15/2024

## 2025-08-12 DIAGNOSIS — R05.1 ACUTE COUGH: ICD-10-CM

## 2025-08-12 RX ORDER — AZELASTINE HYDROCHLORIDE 137 UG/1
SPRAY, METERED NASAL
Qty: 30 ML | Refills: 1 | Status: SHIPPED | OUTPATIENT
Start: 2025-08-12

## (undated) NOTE — LETTER
Patient Name: Shannon Nix  YOB: 1945          MRN number:  [id number]  Date:  11/13/2019  Referring Physician:  Hunter Hedrick         OCCUPATIONAL THERAPY UPPER EXTREMITY EVALUATION    Referring Physician: Dr. Gita Shabazz  Diagnosis: pain a pressure against L hand/wrist to push/pull. Signs and symptoms are consistent with diagnosis of :pain and swelling of L wrist. Pt and OT discussed evaluation findings, pathology, POC and HEP.   Pt voiced understanding and performs HEP correctly without re and evolving symptoms including changing pain levels  PLAN OF CARE   Goals: (to be met in 12 visits)  1-decrease c/o pain in L hand/fingers resting to 3/10,   With use 5/10 to ease all gripping tasks 8 visits.   2- increase L finger and Thumb IRVING by 10 degr

## (undated) NOTE — LETTER
OPIOID TREATMENT AGREEMENT    For Pain Management      Please read each statement, initial at the bottom of each page, and sign the last page to indicate your agreement with this form. If you have any questions about any information in this form or the opioid treatment plan, please request immediate clarification from your physician or health care provider.     The purpose of this agreement is to give you information about the medications you will be taking for pain management and to assure that you and your physician/health care provider comply with state and federal regulations concerning the prescribing of controlled substances. A trial of opioid therapy can be considered for moderate to severe pain with the intent of reducing pain and increasing function. The physician’s goal is for you to have the best quality of life possible given the reality of your clinical condition. The success of treatment depends on mutual trust and honesty in the physician/patient relationship and full agreement and understanding of the risks and benefits of using opioids to treat pain.    I agree to use opioids (morphine-like drugs) as part of my treatment for chronic pain.  I understand that these drugs can be effective, but have a high potential for misuse and are therefore closely controlled by the local, state, and federal government. I understand that my physician/health care provider is prescribing such medication to help manage my pain, and I agree to the following conditions as part of my treatment plan    I am responsible for my pain medications.  I agree to take the medication only as prescribed.    I understand that increasing my dose without the close supervision of my physician could    lead to drug overdose causing severe sedation and respiratory depression and death.    I understand that decreasing or stopping my medication without the close supervision of my physician can lead to withdrawal. Withdrawal symptoms can  include yawning, sweating, watery eyes, runny nose, anxiety, tremors, aching muscles, hot and cold flashes, “goose bumps”, abdominal cramps, and diarrhea.  These symptoms can occur 24-48 hours after the last dose and can last up to 3 weeks.    I will not request or accept controlled substance medication from any other physician or individual while I am receiving such medication from my physician/health care provider at the clinic.    I acknowledge that there are side effects with opioid therapy, and I understand it is my responsibility to notify my physician/health care provider for any side effect that continue or are severe (i.e., difficulty breathing, slow heart rate, sedation, confusion).  I am also responsible for notifying my pain physician immediately if I need to visit another physician or need to visit an emergency room due to pain, of if I become pregnant.    I understand that the opioid medication is strictly for my own use and I agree not to give or sell my medication to others because it may endanger another person’s health and is against the law.    I will inform my physician of all medications I am taking, including herbal remedies.  Medications like Valium or Ativan; sedatives such as Soma, Xanax, Fiorinal; antihistamines like Benadryl; herbal remedies, alcohol, and cough syrup containing alcohol, codeine, or hydrocodone can interact with opioids and produce serious side effects.    I understand I will be expected to return to the clinic as instructed by my provider during the time any of my medication is being adjusted.    I understand that any evidence of drug hoarding, acquisition of any opioid medication or adjunctive analgesia from other physicians (which includes emergency rooms), uncontrolled dose escalation or reduction, loss of prescriptions or failure to follow agreement may result in change to the treatment plan, referral to the Medication Assisted Therapy Program, an may result in  termination of the physician/patient relationship.    I will not use any illicit substances, such as cocaine, marijuana, etc. while taking these medications.  I understand that use of any illicit substances while taking these medications may result in a change to my treatment plan, referral to the Medication Assisted Therapy Clinic, and may result in termination of the physician/patient relationship.    I understand that I should not consume alcohol while taking these medications because the use of alcohol together with opioid medications is warned against.    I am responsible for my opioid prescriptions.  I understand that:    Refill prescriptions can be written for a one month supply and increased to a maximum of two months at the discretion of the provider.    It is my responsibility to schedule appointments for the next opioid refill to ensure I do not run out of medications.    I am responsible for keeping my prescriptions and pain medications in a safe and secure place, such as a locked cabinet or safe.  I am expected to protect my medications from loss or theft.  I am responsible for taking the medication in the dose prescribed and for keeping track of the amount remaining.  If my medication is stolen, I will report this to my local police department and obtain a stolen item report.  I will then report the stolen medication to my physician.  If my medications are lost, misplaced, or stolen, my physician may choose not to replace the medications.    Refills issued by physicians/health care providers in this clinic can only be filled by a pharmacy in the State of [place], even if I am a resident of another state.    Prescriptions for pain medicine or any other prescriptions will be written only during an office visit or during regular office hours.  No refills of any medications during the evening or on weekends.    I must bring back all opioid medications and adjunctive medications prescribed by my physician  in the original containers/bottles at every visit.    Prescriptions will not be written in advance due to vacations, meetings, or other commitments.    If an appointment for a prescription refill is missed, another appointment will be made as soon as possible.  Immediate or emergency appointments will not be possible.    I understand while physical dependence is to be expected after long-term use of opioids, signs of addiction, abuse, or misuse shall prompt the need for substance dependence treatment as well as weaning and detoxification from the opioids.  I further understand the following:    Physical dependence is common to many drugs such as blood pressure medications, anti-seizure medications, and opioids.  It results in biochemical changes such that abruptly stopping these drugs will cause a withdrawal response.  It should be noted that physical dependence does not equal addiction.  A person can be dependent on insulin to treat diabetes or dependent on prednisone (steroids) to treat asthma, but not addicted to the insulin or prednisone.    Addiction is a primary, chronic neurobiologic disease with genetic, psychosocial and environmental factors influencing its development and manifestation.  It is characterized by behavior that includes one or more of the following: impaired control over drug use, compulsive use, continued use despite harm, and cravings.  This means the drug decreases a person’s quality of life.  If a patient exhibits such behavior, the drug will be tapered and the patient will not be a candidate for an opioid trial.  He/she will be referred to an addiction medicine specialist.    Tolerance means a state of adaption in which exposure to the drug induces changes that result in a lessening of one or more of the drug’s effects over time.  The dose of the opioid may have to be adjusted up or down to a dose that produces maximum function and a realistic decrease of the patient’s pain.    If it  appears to my physician/health care provider that there is no improvement in my daily function or quality of life from the controlled substance, I understand my opioids may be discontinued.    If I have a history of alcohol or drug misuse/addiction, I must notify the physician of such history since the treatment with opioids for may increase the possibility of relapse.    I agree and understand that my physician reserves the right to perform random or unannounced urine drug testing.  If requested to provide a urine sample, I agree to cooperate.  If I decide not to provide a urine sample, I understand that my physician may change my treatment plan, including discontinuation of my opioid medications when applicable or complete termination of the physician/patient relationship.  The presence of non-prescribed drug(s) or illicit drug(s) in the urine can be grounds for termination of the physician/patient relationship.  Urine drug testing is not forensic testing, but is done for my benefit as a diagnostic tool and in accordance with certain legal and regulatory guidance on the use of controlled substances to treat pain.    I agree to allow my physician/shellie care provider to contact any health care professional, including behavioral health, family member, pharmacy, legal authority, or regulator agency to obtain or provide information about my care or actions if the physician feels it is necessary.    I understand that non-compliance with the above conditions may result in a re-evaluation of my treatment plan, referral to the Medication Assisted Therapy Clinic, discontinuation of opioid therapy, and possible discharge from the clinic.    I agree to work closely with my physician/health care provider to assure the agreed plan of care is followed.    I acknowledge that I have received a copy of this agreement for my records.          I __________________________________________ have read the above information or it has been  read to me.  All my questions regarding the treatment of pain with opioids have been answered to my satisfaction, and I understand all of the conditions of my participation in the opioid treatment plan listed above.  I hereby agree to the conditions listed about and consent to participate in the opioid medication therapy.        ______________________________    ____________    ______________________________              Patient Signature                                  Date                        Patient Printed Name  ______________________________    ____________    ______________________________              Witness Signature                                Date                       Witness Printed Name

## (undated) NOTE — LETTER
Patient Name: Cassie Musa  YOB: 1945          MRN :  [id number]  Date:  12/6/2019  Referring Physician:  Dr. Benjamin San has attended6, cancelled 2, and no shown 0 visits in Occupational Therapy    Subjective: Lateral Pinch: 12# 10# (+2)      NECK SCREEN:   WNL all motions      MANUAL MUSCLE TESTING: (* denotes performed with pain)  All  and pinches are w/ pain  SPECIAL TESTS:   none    Goals: (to be met in 12 visits)  1-decrease c/o pain in L hand/fingers r